# Patient Record
Sex: MALE | Race: WHITE | NOT HISPANIC OR LATINO
[De-identification: names, ages, dates, MRNs, and addresses within clinical notes are randomized per-mention and may not be internally consistent; named-entity substitution may affect disease eponyms.]

---

## 2017-03-08 ENCOUNTER — FORM ENCOUNTER (OUTPATIENT)
Age: 67
End: 2017-03-08

## 2017-03-09 ENCOUNTER — OUTPATIENT (OUTPATIENT)
Dept: OUTPATIENT SERVICES | Facility: HOSPITAL | Age: 67
LOS: 1 days | End: 2017-03-09
Payer: COMMERCIAL

## 2017-03-09 ENCOUNTER — APPOINTMENT (OUTPATIENT)
Dept: ULTRASOUND IMAGING | Facility: IMAGING CENTER | Age: 67
End: 2017-03-09

## 2017-03-09 ENCOUNTER — APPOINTMENT (OUTPATIENT)
Dept: MAMMOGRAPHY | Facility: IMAGING CENTER | Age: 67
End: 2017-03-09

## 2017-03-09 ENCOUNTER — APPOINTMENT (OUTPATIENT)
Dept: SURGICAL ONCOLOGY | Facility: CLINIC | Age: 67
End: 2017-03-09

## 2017-03-09 VITALS
HEART RATE: 60 BPM | BODY MASS INDEX: 30.8 KG/M2 | SYSTOLIC BLOOD PRESSURE: 169 MMHG | WEIGHT: 220 LBS | DIASTOLIC BLOOD PRESSURE: 90 MMHG | RESPIRATION RATE: 16 BRPM | HEIGHT: 71 IN

## 2017-03-09 DIAGNOSIS — Z00.8 ENCOUNTER FOR OTHER GENERAL EXAMINATION: ICD-10-CM

## 2017-03-09 DIAGNOSIS — Z86.39 PERSONAL HISTORY OF OTHER ENDOCRINE, NUTRITIONAL AND METABOLIC DISEASE: ICD-10-CM

## 2017-03-09 DIAGNOSIS — N63 UNSPECIFIED LUMP IN BREAST: ICD-10-CM

## 2017-03-09 DIAGNOSIS — Z87.898 PERSONAL HISTORY OF OTHER SPECIFIED CONDITIONS: ICD-10-CM

## 2017-03-09 DIAGNOSIS — Z86.79 PERSONAL HISTORY OF OTHER DISEASES OF THE CIRCULATORY SYSTEM: ICD-10-CM

## 2017-03-09 DIAGNOSIS — Z78.9 OTHER SPECIFIED HEALTH STATUS: ICD-10-CM

## 2017-03-09 PROBLEM — Z00.00 ENCOUNTER FOR PREVENTIVE HEALTH EXAMINATION: Noted: 2017-03-09

## 2017-03-09 PROCEDURE — G0279: CPT

## 2017-03-09 PROCEDURE — 77066 DX MAMMO INCL CAD BI: CPT

## 2017-03-09 PROCEDURE — 76641 ULTRASOUND BREAST COMPLETE: CPT

## 2017-03-09 RX ORDER — BACILLUS COAGULANS/INULIN 1B-250 MG
CAPSULE ORAL
Refills: 0 | Status: ACTIVE | COMMUNITY

## 2017-03-15 ENCOUNTER — FORM ENCOUNTER (OUTPATIENT)
Age: 67
End: 2017-03-15

## 2017-03-15 ENCOUNTER — RESULT REVIEW (OUTPATIENT)
Age: 67
End: 2017-03-15

## 2017-03-16 ENCOUNTER — APPOINTMENT (OUTPATIENT)
Dept: ULTRASOUND IMAGING | Facility: IMAGING CENTER | Age: 67
End: 2017-03-16

## 2017-03-16 ENCOUNTER — OUTPATIENT (OUTPATIENT)
Dept: OUTPATIENT SERVICES | Facility: HOSPITAL | Age: 67
LOS: 1 days | End: 2017-03-16
Payer: COMMERCIAL

## 2017-03-16 DIAGNOSIS — N63 UNSPECIFIED LUMP IN BREAST: ICD-10-CM

## 2017-03-16 PROCEDURE — 88377 M/PHMTRC ALYS ISHQUANT/SEMIQ: CPT

## 2017-03-16 PROCEDURE — 19083 BX BREAST 1ST LESION US IMAG: CPT

## 2017-03-16 PROCEDURE — 77065 DX MAMMO INCL CAD UNI: CPT

## 2017-03-16 PROCEDURE — 88305 TISSUE EXAM BY PATHOLOGIST: CPT

## 2017-03-16 PROCEDURE — A4648: CPT

## 2017-03-16 PROCEDURE — 88360 TUMOR IMMUNOHISTOCHEM/MANUAL: CPT

## 2017-03-28 ENCOUNTER — APPOINTMENT (OUTPATIENT)
Dept: SURGICAL ONCOLOGY | Facility: CLINIC | Age: 67
End: 2017-03-28

## 2017-03-28 VITALS
BODY MASS INDEX: 30.8 KG/M2 | HEART RATE: 62 BPM | WEIGHT: 220 LBS | SYSTOLIC BLOOD PRESSURE: 165 MMHG | HEIGHT: 71 IN | DIASTOLIC BLOOD PRESSURE: 93 MMHG

## 2017-03-28 DIAGNOSIS — Z83.3 FAMILY HISTORY OF DIABETES MELLITUS: ICD-10-CM

## 2017-03-28 DIAGNOSIS — Z78.9 OTHER SPECIFIED HEALTH STATUS: ICD-10-CM

## 2017-03-28 DIAGNOSIS — Z80.9 FAMILY HISTORY OF MALIGNANT NEOPLASM, UNSPECIFIED: ICD-10-CM

## 2017-05-01 ENCOUNTER — APPOINTMENT (OUTPATIENT)
Dept: NUCLEAR MEDICINE | Facility: HOSPITAL | Age: 67
End: 2017-05-01

## 2017-10-29 ENCOUNTER — INPATIENT (INPATIENT)
Facility: HOSPITAL | Age: 67
LOS: 1 days | Discharge: HOME | DRG: 204 | End: 2017-10-31
Attending: INTERNAL MEDICINE | Admitting: HOSPITALIST
Payer: MEDICARE

## 2017-10-29 VITALS
HEART RATE: 68 BPM | SYSTOLIC BLOOD PRESSURE: 163 MMHG | TEMPERATURE: 98 F | RESPIRATION RATE: 20 BRPM | DIASTOLIC BLOOD PRESSURE: 80 MMHG | OXYGEN SATURATION: 95 %

## 2017-10-29 DIAGNOSIS — C50.929 MALIGNANT NEOPLASM OF UNSPECIFIED SITE OF UNSPECIFIED MALE BREAST: ICD-10-CM

## 2017-10-29 DIAGNOSIS — F32.9 MAJOR DEPRESSIVE DISORDER, SINGLE EPISODE, UNSPECIFIED: ICD-10-CM

## 2017-10-29 DIAGNOSIS — N40.0 BENIGN PROSTATIC HYPERPLASIA WITHOUT LOWER URINARY TRACT SYMPTOMS: ICD-10-CM

## 2017-10-29 DIAGNOSIS — R07.89 OTHER CHEST PAIN: ICD-10-CM

## 2017-10-29 DIAGNOSIS — Z90.11 ACQUIRED ABSENCE OF RIGHT BREAST AND NIPPLE: Chronic | ICD-10-CM

## 2017-10-29 DIAGNOSIS — E11.9 TYPE 2 DIABETES MELLITUS WITHOUT COMPLICATIONS: ICD-10-CM

## 2017-10-29 DIAGNOSIS — R07.81 PLEURODYNIA: ICD-10-CM

## 2017-10-29 DIAGNOSIS — I10 ESSENTIAL (PRIMARY) HYPERTENSION: ICD-10-CM

## 2017-10-29 LAB
ALBUMIN SERPL ELPH-MCNC: 4.3 G/DL — SIGNIFICANT CHANGE UP (ref 3.3–5)
ALP SERPL-CCNC: 72 U/L — SIGNIFICANT CHANGE UP (ref 40–120)
ALT FLD-CCNC: 13 U/L RC — SIGNIFICANT CHANGE UP (ref 10–45)
ANION GAP SERPL CALC-SCNC: 12 MMOL/L — SIGNIFICANT CHANGE UP (ref 5–17)
APTT BLD: 32.9 SEC — SIGNIFICANT CHANGE UP (ref 27.5–37.4)
AST SERPL-CCNC: 15 U/L — SIGNIFICANT CHANGE UP (ref 10–40)
BASOPHILS # BLD AUTO: 0 K/UL — SIGNIFICANT CHANGE UP (ref 0–0.2)
BASOPHILS NFR BLD AUTO: 0.2 % — SIGNIFICANT CHANGE UP (ref 0–2)
BILIRUB SERPL-MCNC: 0.2 MG/DL — SIGNIFICANT CHANGE UP (ref 0.2–1.2)
BUN SERPL-MCNC: 22 MG/DL — SIGNIFICANT CHANGE UP (ref 7–23)
CALCIUM SERPL-MCNC: 10.2 MG/DL — SIGNIFICANT CHANGE UP (ref 8.4–10.5)
CHLORIDE SERPL-SCNC: 102 MMOL/L — SIGNIFICANT CHANGE UP (ref 96–108)
CK SERPL-CCNC: 35 U/L — SIGNIFICANT CHANGE UP (ref 30–200)
CO2 SERPL-SCNC: 29 MMOL/L — SIGNIFICANT CHANGE UP (ref 22–31)
CREAT SERPL-MCNC: 1.09 MG/DL — SIGNIFICANT CHANGE UP (ref 0.5–1.3)
EOSINOPHIL # BLD AUTO: 0.2 K/UL — SIGNIFICANT CHANGE UP (ref 0–0.5)
EOSINOPHIL NFR BLD AUTO: 1.5 % — SIGNIFICANT CHANGE UP (ref 0–6)
ERYTHROCYTE [SEDIMENTATION RATE] IN BLOOD: 42 MM/HR — HIGH (ref 0–20)
GLUCOSE BLDC GLUCOMTR-MCNC: 145 MG/DL — HIGH (ref 70–99)
GLUCOSE BLDC GLUCOMTR-MCNC: 212 MG/DL — HIGH (ref 70–99)
GLUCOSE BLDC GLUCOMTR-MCNC: 235 MG/DL — HIGH (ref 70–99)
GLUCOSE SERPL-MCNC: 134 MG/DL — HIGH (ref 70–99)
HCT VFR BLD CALC: 30.3 % — LOW (ref 39–50)
HGB BLD-MCNC: 11 G/DL — LOW (ref 13–17)
INR BLD: 0.96 RATIO — SIGNIFICANT CHANGE UP (ref 0.88–1.16)
LYMPHOCYTES # BLD AUTO: 0.8 K/UL — LOW (ref 1–3.3)
LYMPHOCYTES # BLD AUTO: 4.8 % — LOW (ref 13–44)
MCHC RBC-ENTMCNC: 36.2 PG — HIGH (ref 27–34)
MCHC RBC-ENTMCNC: 36.3 GM/DL — HIGH (ref 32–36)
MCV RBC AUTO: 99.9 FL — SIGNIFICANT CHANGE UP (ref 80–100)
MONOCYTES # BLD AUTO: 0.2 K/UL — SIGNIFICANT CHANGE UP (ref 0–0.9)
MONOCYTES NFR BLD AUTO: 1.1 % — LOW (ref 2–14)
NEUTROPHILS # BLD AUTO: 15.3 K/UL — HIGH (ref 1.8–7.4)
NEUTROPHILS NFR BLD AUTO: 92.3 % — HIGH (ref 43–77)
NT-PROBNP SERPL-SCNC: 110 PG/ML — SIGNIFICANT CHANGE UP (ref 0–300)
PLATELET # BLD AUTO: 187 K/UL — SIGNIFICANT CHANGE UP (ref 150–400)
POTASSIUM SERPL-MCNC: 3.8 MMOL/L — SIGNIFICANT CHANGE UP (ref 3.5–5.3)
POTASSIUM SERPL-SCNC: 3.8 MMOL/L — SIGNIFICANT CHANGE UP (ref 3.5–5.3)
PROT SERPL-MCNC: 6.8 G/DL — SIGNIFICANT CHANGE UP (ref 6–8.3)
PROTHROM AB SERPL-ACNC: 10.4 SEC — SIGNIFICANT CHANGE UP (ref 9.8–12.7)
RBC # BLD: 3.03 M/UL — LOW (ref 4.2–5.8)
RBC # FLD: 12.9 % — SIGNIFICANT CHANGE UP (ref 10.3–14.5)
SODIUM SERPL-SCNC: 143 MMOL/L — SIGNIFICANT CHANGE UP (ref 135–145)
TROPONIN T SERPL-MCNC: <0.01 NG/ML — SIGNIFICANT CHANGE UP (ref 0–0.06)
TROPONIN T SERPL-MCNC: <0.01 NG/ML — SIGNIFICANT CHANGE UP (ref 0–0.06)
WBC # BLD: 16.6 K/UL — HIGH (ref 3.8–10.5)
WBC # FLD AUTO: 16.6 K/UL — HIGH (ref 3.8–10.5)

## 2017-10-29 PROCEDURE — 99285 EMERGENCY DEPT VISIT HI MDM: CPT | Mod: 25

## 2017-10-29 PROCEDURE — 71020: CPT | Mod: 26

## 2017-10-29 PROCEDURE — 71275 CT ANGIOGRAPHY CHEST: CPT | Mod: 26

## 2017-10-29 PROCEDURE — 93010 ELECTROCARDIOGRAM REPORT: CPT | Mod: 59

## 2017-10-29 PROCEDURE — 99223 1ST HOSP IP/OBS HIGH 75: CPT | Mod: AI

## 2017-10-29 RX ORDER — MORPHINE SULFATE 50 MG/1
2 CAPSULE, EXTENDED RELEASE ORAL ONCE
Qty: 0 | Refills: 0 | Status: DISCONTINUED | OUTPATIENT
Start: 2017-10-29 | End: 2017-10-29

## 2017-10-29 RX ORDER — SODIUM CHLORIDE 9 MG/ML
1000 INJECTION INTRAMUSCULAR; INTRAVENOUS; SUBCUTANEOUS ONCE
Qty: 0 | Refills: 0 | Status: DISCONTINUED | OUTPATIENT
Start: 2017-10-29 | End: 2017-10-29

## 2017-10-29 RX ORDER — AMLODIPINE BESYLATE 2.5 MG/1
10 TABLET ORAL DAILY
Qty: 0 | Refills: 0 | Status: DISCONTINUED | OUTPATIENT
Start: 2017-10-29 | End: 2017-10-31

## 2017-10-29 RX ORDER — TAMOXIFEN CITRATE 20 MG/1
20 TABLET, FILM COATED ORAL DAILY
Qty: 0 | Refills: 0 | Status: DISCONTINUED | OUTPATIENT
Start: 2017-10-29 | End: 2017-10-31

## 2017-10-29 RX ORDER — LIDOCAINE 4 G/100G
1 CREAM TOPICAL ONCE
Qty: 0 | Refills: 0 | Status: COMPLETED | OUTPATIENT
Start: 2017-10-29 | End: 2017-10-29

## 2017-10-29 RX ORDER — SERTRALINE 25 MG/1
100 TABLET, FILM COATED ORAL DAILY
Qty: 0 | Refills: 0 | Status: DISCONTINUED | OUTPATIENT
Start: 2017-10-29 | End: 2017-10-31

## 2017-10-29 RX ORDER — DEXTROSE 50 % IN WATER 50 %
25 SYRINGE (ML) INTRAVENOUS ONCE
Qty: 0 | Refills: 0 | Status: DISCONTINUED | OUTPATIENT
Start: 2017-10-29 | End: 2017-10-31

## 2017-10-29 RX ORDER — TAMSULOSIN HYDROCHLORIDE 0.4 MG/1
0.4 CAPSULE ORAL AT BEDTIME
Qty: 0 | Refills: 0 | Status: DISCONTINUED | OUTPATIENT
Start: 2017-10-29 | End: 2017-10-31

## 2017-10-29 RX ORDER — METFORMIN HYDROCHLORIDE 850 MG/1
1 TABLET ORAL
Qty: 0 | Refills: 0 | COMMUNITY

## 2017-10-29 RX ORDER — NEBIVOLOL HYDROCHLORIDE 5 MG/1
1 TABLET ORAL
Qty: 0 | Refills: 0 | COMMUNITY

## 2017-10-29 RX ORDER — AMLODIPINE AND VALSARTAN 5; 320 MG/1; MG/1
1 TABLET, FILM COATED ORAL
Qty: 0 | Refills: 0 | COMMUNITY

## 2017-10-29 RX ORDER — GEMFIBROZIL 600 MG
1 TABLET ORAL
Qty: 0 | Refills: 0 | COMMUNITY

## 2017-10-29 RX ORDER — INSULIN LISPRO 100/ML
VIAL (ML) SUBCUTANEOUS AT BEDTIME
Qty: 0 | Refills: 0 | Status: DISCONTINUED | OUTPATIENT
Start: 2017-10-29 | End: 2017-10-31

## 2017-10-29 RX ORDER — ASPIRIN/CALCIUM CARB/MAGNESIUM 324 MG
324 TABLET ORAL ONCE
Qty: 0 | Refills: 0 | Status: COMPLETED | OUTPATIENT
Start: 2017-10-29 | End: 2017-10-29

## 2017-10-29 RX ORDER — NEBIVOLOL HYDROCHLORIDE 5 MG/1
5 TABLET ORAL DAILY
Qty: 0 | Refills: 0 | Status: DISCONTINUED | OUTPATIENT
Start: 2017-10-29 | End: 2017-10-31

## 2017-10-29 RX ORDER — ENOXAPARIN SODIUM 100 MG/ML
40 INJECTION SUBCUTANEOUS DAILY
Qty: 0 | Refills: 0 | Status: DISCONTINUED | OUTPATIENT
Start: 2017-10-29 | End: 2017-10-31

## 2017-10-29 RX ORDER — INSULIN LISPRO 100/ML
VIAL (ML) SUBCUTANEOUS
Qty: 0 | Refills: 0 | Status: DISCONTINUED | OUTPATIENT
Start: 2017-10-29 | End: 2017-10-31

## 2017-10-29 RX ORDER — SODIUM CHLORIDE 9 MG/ML
1000 INJECTION, SOLUTION INTRAVENOUS
Qty: 0 | Refills: 0 | Status: DISCONTINUED | OUTPATIENT
Start: 2017-10-29 | End: 2017-10-31

## 2017-10-29 RX ORDER — SODIUM CHLORIDE 9 MG/ML
1000 INJECTION INTRAMUSCULAR; INTRAVENOUS; SUBCUTANEOUS ONCE
Qty: 0 | Refills: 0 | Status: COMPLETED | OUTPATIENT
Start: 2017-10-29 | End: 2017-10-29

## 2017-10-29 RX ORDER — DEXTROSE 50 % IN WATER 50 %
1 SYRINGE (ML) INTRAVENOUS ONCE
Qty: 0 | Refills: 0 | Status: DISCONTINUED | OUTPATIENT
Start: 2017-10-29 | End: 2017-10-31

## 2017-10-29 RX ORDER — VALSARTAN 80 MG/1
160 TABLET ORAL DAILY
Qty: 0 | Refills: 0 | Status: DISCONTINUED | OUTPATIENT
Start: 2017-10-29 | End: 2017-10-31

## 2017-10-29 RX ORDER — HYDROMORPHONE HYDROCHLORIDE 2 MG/ML
0.5 INJECTION INTRAMUSCULAR; INTRAVENOUS; SUBCUTANEOUS EVERY 6 HOURS
Qty: 0 | Refills: 0 | Status: DISCONTINUED | OUTPATIENT
Start: 2017-10-29 | End: 2017-10-31

## 2017-10-29 RX ORDER — NITROGLYCERIN 6.5 MG
0.4 CAPSULE, EXTENDED RELEASE ORAL ONCE
Qty: 0 | Refills: 0 | Status: COMPLETED | OUTPATIENT
Start: 2017-10-29 | End: 2017-10-29

## 2017-10-29 RX ORDER — DEXTROSE 50 % IN WATER 50 %
12.5 SYRINGE (ML) INTRAVENOUS ONCE
Qty: 0 | Refills: 0 | Status: DISCONTINUED | OUTPATIENT
Start: 2017-10-29 | End: 2017-10-31

## 2017-10-29 RX ORDER — HYDROMORPHONE HYDROCHLORIDE 2 MG/ML
1 INJECTION INTRAMUSCULAR; INTRAVENOUS; SUBCUTANEOUS ONCE
Qty: 0 | Refills: 0 | Status: DISCONTINUED | OUTPATIENT
Start: 2017-10-29 | End: 2017-10-29

## 2017-10-29 RX ORDER — ACETAMINOPHEN 500 MG
1000 TABLET ORAL ONCE
Qty: 0 | Refills: 0 | Status: COMPLETED | OUTPATIENT
Start: 2017-10-29 | End: 2017-10-29

## 2017-10-29 RX ORDER — SODIUM CHLORIDE 9 MG/ML
3 INJECTION INTRAMUSCULAR; INTRAVENOUS; SUBCUTANEOUS ONCE
Qty: 0 | Refills: 0 | Status: COMPLETED | OUTPATIENT
Start: 2017-10-29 | End: 2017-10-29

## 2017-10-29 RX ORDER — MIRABEGRON 50 MG/1
1 TABLET, EXTENDED RELEASE ORAL
Qty: 0 | Refills: 0 | COMMUNITY

## 2017-10-29 RX ORDER — HYDROCHLOROTHIAZIDE 25 MG
50 TABLET ORAL DAILY
Qty: 0 | Refills: 0 | Status: DISCONTINUED | OUTPATIENT
Start: 2017-10-29 | End: 2017-10-31

## 2017-10-29 RX ORDER — MORPHINE SULFATE 50 MG/1
4 CAPSULE, EXTENDED RELEASE ORAL ONCE
Qty: 0 | Refills: 0 | Status: DISCONTINUED | OUTPATIENT
Start: 2017-10-29 | End: 2017-10-29

## 2017-10-29 RX ORDER — ASPIRIN/CALCIUM CARB/MAGNESIUM 324 MG
81 TABLET ORAL DAILY
Qty: 0 | Refills: 0 | Status: DISCONTINUED | OUTPATIENT
Start: 2017-10-29 | End: 2017-10-31

## 2017-10-29 RX ORDER — GLUCAGON INJECTION, SOLUTION 0.5 MG/.1ML
1 INJECTION, SOLUTION SUBCUTANEOUS ONCE
Qty: 0 | Refills: 0 | Status: DISCONTINUED | OUTPATIENT
Start: 2017-10-29 | End: 2017-10-31

## 2017-10-29 RX ORDER — FAMOTIDINE 10 MG/ML
20 INJECTION INTRAVENOUS ONCE
Qty: 0 | Refills: 0 | Status: COMPLETED | OUTPATIENT
Start: 2017-10-29 | End: 2017-10-29

## 2017-10-29 RX ORDER — INFLUENZA VIRUS VACCINE 15; 15; 15; 15 UG/.5ML; UG/.5ML; UG/.5ML; UG/.5ML
0.5 SUSPENSION INTRAMUSCULAR ONCE
Qty: 0 | Refills: 0 | Status: DISCONTINUED | OUTPATIENT
Start: 2017-10-29 | End: 2017-10-31

## 2017-10-29 RX ADMIN — HYDROMORPHONE HYDROCHLORIDE 1 MILLIGRAM(S): 2 INJECTION INTRAMUSCULAR; INTRAVENOUS; SUBCUTANEOUS at 08:25

## 2017-10-29 RX ADMIN — LIDOCAINE 1 PATCH: 4 CREAM TOPICAL at 21:28

## 2017-10-29 RX ADMIN — LIDOCAINE 1 PATCH: 4 CREAM TOPICAL at 08:25

## 2017-10-29 RX ADMIN — FAMOTIDINE 20 MILLIGRAM(S): 10 INJECTION INTRAVENOUS at 06:22

## 2017-10-29 RX ADMIN — Medication 324 MILLIGRAM(S): at 03:08

## 2017-10-29 RX ADMIN — AMLODIPINE BESYLATE 10 MILLIGRAM(S): 2.5 TABLET ORAL at 19:12

## 2017-10-29 RX ADMIN — MORPHINE SULFATE 2 MILLIGRAM(S): 50 CAPSULE, EXTENDED RELEASE ORAL at 05:10

## 2017-10-29 RX ADMIN — Medication 0.4 MILLIGRAM(S): at 05:58

## 2017-10-29 RX ADMIN — MORPHINE SULFATE 4 MILLIGRAM(S): 50 CAPSULE, EXTENDED RELEASE ORAL at 06:22

## 2017-10-29 RX ADMIN — Medication 400 MILLIGRAM(S): at 04:30

## 2017-10-29 RX ADMIN — HYDROMORPHONE HYDROCHLORIDE 0.5 MILLIGRAM(S): 2 INJECTION INTRAMUSCULAR; INTRAVENOUS; SUBCUTANEOUS at 16:48

## 2017-10-29 RX ADMIN — SODIUM CHLORIDE 4000 MILLILITER(S): 9 INJECTION INTRAMUSCULAR; INTRAVENOUS; SUBCUTANEOUS at 05:59

## 2017-10-29 RX ADMIN — TAMSULOSIN HYDROCHLORIDE 0.4 MILLIGRAM(S): 0.4 CAPSULE ORAL at 21:31

## 2017-10-29 RX ADMIN — SODIUM CHLORIDE 3 MILLILITER(S): 9 INJECTION INTRAMUSCULAR; INTRAVENOUS; SUBCUTANEOUS at 03:08

## 2017-10-29 RX ADMIN — Medication 4: at 18:20

## 2017-10-29 RX ADMIN — SODIUM CHLORIDE 4000 MILLILITER(S): 9 INJECTION INTRAMUSCULAR; INTRAVENOUS; SUBCUTANEOUS at 03:28

## 2017-10-29 RX ADMIN — HYDROMORPHONE HYDROCHLORIDE 0.5 MILLIGRAM(S): 2 INJECTION INTRAMUSCULAR; INTRAVENOUS; SUBCUTANEOUS at 16:13

## 2017-10-29 NOTE — H&P ADULT - NEGATIVE BREAST SYMPTOMS
no breast lump L/no nipple discharge L/no nipple discharge R/no breast lump R/no breast tenderness R/no breast tenderness L

## 2017-10-29 NOTE — H&P ADULT - HISTORY OF PRESENT ILLNESS
68yo M with  ER+ PA-  breast cancer s/p chemotherapy and R mastectomy with tissue expanders, p/w chest heaviness x 3 days. Patient received chemotherapy 5 days ago. He went to the gym and felt some mild left sided chest heaviness 8/10 with no radiation the following day. It lasted a few hours and was worse with movement and laying down. He then decided to go swimming and it felt better. Night before admission patient had the same pain again but this time it occurred when he was laying down and was worse with inspiration. No recent travel, no sob, no pnd, no LE edema. He has had chest pain few years ago but nothing like this. No fevers, no chills, no recent sick contacts. This time when he received chemotherapy he did not received any growth factor support.

## 2017-10-29 NOTE — H&P ADULT - NSHPPHYSICALEXAM_GEN_ALL_CORE
Vital Signs Last 24 Hrs  T(C): 36.8 (29 Oct 2017 11:45), Max: 36.8 (29 Oct 2017 02:27)  T(F): 98.2 (29 Oct 2017 11:45), Max: 98.3 (29 Oct 2017 02:27)  HR: 56 (29 Oct 2017 11:45) (56 - 72)  BP: 147/89 (29 Oct 2017 11:45) (147/89 - 171/88)  BP(mean): --  RR: 20 (29 Oct 2017 11:52) (18 - 20)  SpO2: 95% (29 Oct 2017 11:52) (87% - 96%)

## 2017-10-29 NOTE — ED ADULT NURSE REASSESSMENT NOTE - NS ED NURSE REASSESS COMMENT FT1
Report received from night RALPH Cheng. Pt found sleeping in semi-richmond position. VS documented. Pt states chest pain is unrelieved, on cardiac monitor in sinus rhythm. MD aware, will continue to monitor.

## 2017-10-29 NOTE — H&P ADULT - NEGATIVE CARDIOVASCULAR SYMPTOMS
no palpitations/no claudication/no orthopnea/no dyspnea on exertion/no paroxysmal nocturnal dyspnea/no peripheral edema

## 2017-10-29 NOTE — ED PROVIDER NOTE - PROGRESS NOTE DETAILS
complaint of worsening CP, EKG no changes, offered tylenol patient declined -Lachelle DO complaint of pain still despite tylenol/morphine, will trial NO, repeat EKG prior wnl. trop engative, CTA negative for PE only with atelectasis -Slowlise DO

## 2017-10-29 NOTE — H&P ADULT - PROBLEM SELECTOR PLAN 1
non reproducible chest pain/ doubt msk   atypical chest pain - will trend enzymes, place on telemetry  given leukocytosis, elevated esr- and pleuritic type chest pain- keeping pericarditis on the differential so will obtain 2d echo and start on baby aspirin in the mean time,  ekg not showing signs of acute pericarditis- no st elevations/ no depression of pr interval in avR  - f/u cardio recs- follows up with dr. georges- to see in hospital  - monitor esr/crp  - if pericarditis ruled out by 2d echo pt may warrant inpatient stress testing  follow up a1c/lipid panel ( not on statin)  - PE ruled out by CTA ( spoke w rads resident who did not see any pericardial effusion on ct) non reproducible chest pain/ doubt msk   atypical chest pain - will trend enzymes, place on telemetry  given leukocytosis, elevated esr- and pleuritic type chest pain- keeping pericarditis on the differential so will obtain 2d echo   - aspirin 81mg daily  ekg not showing signs of acute pericarditis- no st elevations/ no depression of pr interval in avR  - f/u cardio recs- follows up with dr. georges- to see in hospital  - monitor esr/crp  - if pericarditis ruled out by 2d echo pt may warrant inpatient stress testing  follow up a1c/lipid panel ( not on statin)  - PE ruled out by CTA ( spoke w rads resident who did not see any pericardial effusion on ct) non reproducible chest pain/ doubt msk   atypical chest pain - will trend enzymes, place on telemetry  given leukocytosis, elevated esr- and pleuritic type chest pain- keeping pericarditis on the differential so will obtain 2d echo   - aspirin 81mg daily  ekg not showing signs of acute pericarditis- no st elevations/ no depression of pr interval in avR  - f/u cardio recs- follows up with dr. georges- to see in hospital  - monitor esr/crp  - if pericarditis ruled out by 2d echo pt may warrant inpatient stress testing  follow up a1c/lipid panel ( not on statin)  - PE ruled out by CTA ( spoke w rads resident who did not see any pericardial effusion on ct)  - obtain blood cultures

## 2017-10-29 NOTE — H&P ADULT - NSHPLABSRESULTS_GEN_ALL_CORE
11.0   16.6  )-----------( 187      ( 29 Oct 2017 03:09 )             30.3       143  |  102  |  22  ----------------------------<  134<H>  3.8   |  29  |  1.09    Ca    10.2      29 Oct 2017 03:09    TPro  6.8  /  Alb  4.3  /  TBili  0.2  /  DBili  x   /  AST  15  /  ALT  13  /  AlkPhos  72  10-29    Troponin T, Serum (10.29.17 @ 03:09)    Troponin T, Serum: <0.01: Reference Interval for Troponin T  Less than 0.06 ng/mL - includes the 99th percentile of a healthy  population at a method C.V. of 10% or less.  NOTE: Troponin T is measured by the Roche ECLIA method and these  results are not interchangable with Troponin I results since they are  different assays with different reference intervals. ng/mL  < from: CT Angio Chest w/ IV Cont (10.29.17 @ 05:07) >    FINDINGS:    LUNGS AND LARGE AIRWAYS: Patent central airways. Lingula and bilateral   lower lobe subsegmental atelectasis.  PLEURA: No pleural effusion.  VESSELS: Evaluation for subsegmental pulmonary embolism is limited by   respiratory motion and suboptimal opacification of the pulmonary tree. No   main, lobar, or segmental pulmonary embolism.  HEART: Heart size is normal. No pericardial effusion. Coronary artery   calcification.  MEDIASTINUM AND ABDULAZIZ: Few small volume nonspecific mediastinal lymph   nodes.  CHEST WALL AND LOWER NECK: Right axillary surgical clips are noted.   Patient is status post right breast implant. There is infolding of the   implant capsule, which may be seen in setting of capsular contracture.  UPPER ABDOMEN: Status post cholecystectomy. Mild nodular thickening of   the bilateral adrenal glands, stable since 10/23/2015.  BONES: Multilevel degenerative changes of the spine.    IMPRESSION:    No main, lobar, or segmental pulmonary embolism. Limited evaluation for   subsegmental pulmonary embolism secondary to respiratory motion.      < end of copied text >    cxr personally reviewed by me no obvious infiltrate  ekg personally reviewed by me twi [id number]

## 2017-10-29 NOTE — ED ADULT NURSE NOTE - OBJECTIVE STATEMENT
67y male presents to the ER c/o chest pressure. Pt is alert and oriented x 4 and speaking coherently. Pt states around 2300 on 10/28 he woke up from sleep with 6/10 chest pressure. Pt states he recently started working out- denies pain worsening when pressing on chest. pt states the pain is midsternal, non radiating. Pt has hx of htn, high cholesterol, dm, and breast ca. pt recently completed chemo for breast ca. pt denies sob, fevers,chills, n/v/d. Pt in NAD, respirations equal and regular. EKG completed. pt on tele. safety maintained. will reassess.

## 2017-10-29 NOTE — ED PROVIDER NOTE - NOTES
recommend checking ESR and starting steroids for possible pericarditis and formal echo. would like dr. loredo to see patient in hospital recommend checking ESR and starting steroids for possible pericarditis and formal echo. would like dr. loredo to see patient in hospital, paged Dr. Loredo no answer by time of sign out

## 2017-10-29 NOTE — ED PROVIDER NOTE - NS ED ROS FT
ROS: +CP no SOB. no cough. no fever. no n/v/d/c. no abd pain. no rash. no bleeding. no urinary complaints. no weakness. no vision changes. no HA. no neck/back pain. no extremity swelling/deformity. No change in mental status.

## 2017-10-29 NOTE — H&P ADULT - NSHPOUTPATIENTPROVIDERS_GEN_ALL_CORE
dr torre ( [facility] [place]) dr torre ( [facility] [place])  dr. egorges ( [facility])

## 2017-10-29 NOTE — ED ADULT NURSE REASSESSMENT NOTE - NS ED NURSE REASSESS COMMENT FT1
Pt found resting in semi-richmond position, no labored breathing or distress. Pt appears diaphoretic with Spo2 87% on room air. Oxygen administered via NC at 4 lpm, Spo2 increased to 95%; blood sugar 212. Pt currently denies any headache, feeling lightheaded, dizziness, difficulty breathing, abdominal pain, N/V/D, fever or chills. PA aware; will continue to monitor. Pt found resting in semi-richmond position, no labored breathing or distress. Pt appears diaphoretic with Spo2 87% on room air. Oxygen administered via NC at 4 lpm, Spo2 increased to 95%; blood sugar 212. Pt currently denies any headache, feeling lightheaded, dizziness, difficulty breathing, abdominal pain, N/V/D, fever or chills. MD aware; will continue to monitor.

## 2017-10-29 NOTE — ED PROVIDER NOTE - ATTENDING CONTRIBUTION TO CARE
Attending MD Brandt: I personally have seen and examined this patient.  Resident note reviewed and agree on plan of care and except where noted.  See below for details.     67M with PMH including breast cancer on chemo, DM, HTN, HLD presents to the ED with chest pain.  Reports last chemo about 5 days ago, exercised 3 days ago.  Reports felt chest pressure, tightness and soreness the day following his exercising.  Reports worse with movement.  Reports then went swimming.  Reports tonight reports chest pain at rest, pleuritic, worse with supine.  Denies history of DVT or PE.  Reports bilateral lower extremity paresthesias, no weakness, no HA.  Denies previous similar episode.  Denies abdominal pain, nausea, vomiting, diarrhea, blood in stools. Denies dysuria, hematuria, change in urinary habits including frequency, urgency. Denies fevers, chills, dizziness, weakness. On exam, NAD, head NCAT, PERRL, FROM at neck, no tenderness to palpation or stepoffs along length of spine, lungs CTAB with good inspiratory effort, +S1S2, no m/r/g, abdomen soft with +BS, NT, ND, no CVAT, moving all extremities with 5/5 strength bilateral upper and lower extremities, good and equal  strength bilaterally; A/P: 67M with pleuritic chest pain in the setting of cancer, will obtain CTA chest, will give ASA, will obtian EKG and labs, will give IVFs, pain control, CXR, admit

## 2017-10-29 NOTE — ED PROVIDER NOTE - OBJECTIVE STATEMENT
68yo M with breast CA completed chemo 5 days ago, worked out 3 days ago, felt chest pressure/tightness/soreness the next day worse with movement, went swimming felt better. tonight chest pain @ rest, +pleuritic, +worse with lying down. no h/o PE/DVT. no LE edema. feeling paresthesias b/l LE no weakness. no HA. no h/o CAD.     PCP- Garett 68yo M with breast CA completed chemo 5 days ago, worked out 3 days ago, felt chest pressure/tightness/soreness the next day worse with movement, went swimming felt better. tonight chest pain @ rest, +pleuritic, +worse with lying down. no h/o PE/DVT. no LE edema. feeling paresthesias b/l LE no weakness. no HA. no h/o CAD.     PCP- @ [facility]

## 2017-10-29 NOTE — ED PROVIDER NOTE - MEDICAL DECISION MAKING DETAILS
could be [facility] started after working out, but not reproducible, has multiple cardiac risk factors will check trop. with CA at risk for PE will CT. no suspicion for GI source. no infectious sx. Heart score 5, will need provacative testing

## 2017-10-29 NOTE — ED ADULT NURSE NOTE - PMH
Breast cancer in male    Cholecystitis    DM (diabetes mellitus)    High cholesterol    HTN (hypertension)

## 2017-10-29 NOTE — ED PROVIDER NOTE - PHYSICAL EXAMINATION
Gen: NAD, AOx3  Head: NCAT  HEENT: PERRL, oral mucosa moist, normal conjunctiva, neck supple  Lung: CTAB, no respiratory distress  CV: rrr, no murmur, Normal perfusion, no reproducible chest wall pain  Abd: soft, NTND  MSK: No edema, no visible deformities  Neuro: No focal neurologic deficits  Skin: No rash   Psych: normal affect

## 2017-10-30 ENCOUNTER — TRANSCRIPTION ENCOUNTER (OUTPATIENT)
Age: 67
End: 2017-10-30

## 2017-10-30 DIAGNOSIS — E11.9 TYPE 2 DIABETES MELLITUS WITHOUT COMPLICATIONS: ICD-10-CM

## 2017-10-30 DIAGNOSIS — D72.829 ELEVATED WHITE BLOOD CELL COUNT, UNSPECIFIED: ICD-10-CM

## 2017-10-30 DIAGNOSIS — R07.81 PLEURODYNIA: ICD-10-CM

## 2017-10-30 DIAGNOSIS — I10 ESSENTIAL (PRIMARY) HYPERTENSION: ICD-10-CM

## 2017-10-30 LAB
ALBUMIN SERPL ELPH-MCNC: 3.8 G/DL — SIGNIFICANT CHANGE UP (ref 3.3–5)
ALP SERPL-CCNC: 52 U/L — SIGNIFICANT CHANGE UP (ref 40–120)
ALT FLD-CCNC: 10 U/L RC — SIGNIFICANT CHANGE UP (ref 10–45)
ANION GAP SERPL CALC-SCNC: 14 MMOL/L — SIGNIFICANT CHANGE UP (ref 5–17)
AST SERPL-CCNC: 13 U/L — SIGNIFICANT CHANGE UP (ref 10–40)
BASOPHILS # BLD AUTO: 0 K/UL — SIGNIFICANT CHANGE UP (ref 0–0.2)
BASOPHILS NFR BLD AUTO: 0.3 % — SIGNIFICANT CHANGE UP (ref 0–2)
BILIRUB SERPL-MCNC: 0.4 MG/DL — SIGNIFICANT CHANGE UP (ref 0.2–1.2)
BUN SERPL-MCNC: 21 MG/DL — SIGNIFICANT CHANGE UP (ref 7–23)
CALCIUM SERPL-MCNC: 9.4 MG/DL — SIGNIFICANT CHANGE UP (ref 8.4–10.5)
CHLORIDE SERPL-SCNC: 100 MMOL/L — SIGNIFICANT CHANGE UP (ref 96–108)
CHOLEST SERPL-MCNC: 111 MG/DL — SIGNIFICANT CHANGE UP (ref 10–199)
CO2 SERPL-SCNC: 24 MMOL/L — SIGNIFICANT CHANGE UP (ref 22–31)
CREAT SERPL-MCNC: 0.99 MG/DL — SIGNIFICANT CHANGE UP (ref 0.5–1.3)
CRP SERPL-MCNC: 11.3 MG/DL — HIGH (ref 0–0.4)
EOSINOPHIL # BLD AUTO: 0 K/UL — SIGNIFICANT CHANGE UP (ref 0–0.5)
EOSINOPHIL NFR BLD AUTO: 0.3 % — SIGNIFICANT CHANGE UP (ref 0–6)
ERYTHROCYTE [SEDIMENTATION RATE] IN BLOOD: 77 MM/HR — HIGH (ref 0–20)
GLUCOSE BLDC GLUCOMTR-MCNC: 115 MG/DL — HIGH (ref 70–99)
GLUCOSE BLDC GLUCOMTR-MCNC: 138 MG/DL — HIGH (ref 70–99)
GLUCOSE BLDC GLUCOMTR-MCNC: 144 MG/DL — HIGH (ref 70–99)
GLUCOSE SERPL-MCNC: 143 MG/DL — HIGH (ref 70–99)
HBA1C BLD-MCNC: 5.2 % — SIGNIFICANT CHANGE UP (ref 4–5.6)
HCT VFR BLD CALC: 29.3 % — LOW (ref 39–50)
HDLC SERPL-MCNC: 34 MG/DL — LOW (ref 40–125)
HGB BLD-MCNC: 10.3 G/DL — LOW (ref 13–17)
LIPID PNL WITH DIRECT LDL SERPL: 61 MG/DL — SIGNIFICANT CHANGE UP
LYMPHOCYTES # BLD AUTO: 0.8 K/UL — LOW (ref 1–3.3)
LYMPHOCYTES # BLD AUTO: 5.6 % — LOW (ref 13–44)
MAGNESIUM SERPL-MCNC: 1.9 MG/DL — SIGNIFICANT CHANGE UP (ref 1.6–2.6)
MCHC RBC-ENTMCNC: 35.3 GM/DL — SIGNIFICANT CHANGE UP (ref 32–36)
MCHC RBC-ENTMCNC: 35.8 PG — HIGH (ref 27–34)
MCV RBC AUTO: 102 FL — HIGH (ref 80–100)
MONOCYTES # BLD AUTO: 0.4 K/UL — SIGNIFICANT CHANGE UP (ref 0–0.9)
MONOCYTES NFR BLD AUTO: 3 % — SIGNIFICANT CHANGE UP (ref 2–14)
NEUTROPHILS # BLD AUTO: 12.2 K/UL — HIGH (ref 1.8–7.4)
NEUTROPHILS NFR BLD AUTO: 90.8 % — HIGH (ref 43–77)
PLATELET # BLD AUTO: 163 K/UL — SIGNIFICANT CHANGE UP (ref 150–400)
POTASSIUM SERPL-MCNC: 3.2 MMOL/L — LOW (ref 3.5–5.3)
POTASSIUM SERPL-SCNC: 3.2 MMOL/L — LOW (ref 3.5–5.3)
PROT SERPL-MCNC: 6.5 G/DL — SIGNIFICANT CHANGE UP (ref 6–8.3)
RAPID RVP RESULT: SIGNIFICANT CHANGE UP
RBC # BLD: 2.89 M/UL — LOW (ref 4.2–5.8)
RBC # FLD: 12.7 % — SIGNIFICANT CHANGE UP (ref 10.3–14.5)
SODIUM SERPL-SCNC: 138 MMOL/L — SIGNIFICANT CHANGE UP (ref 135–145)
TOTAL CHOLESTEROL/HDL RATIO MEASUREMENT: 3.3 RATIO — LOW (ref 3.4–9.6)
TRIGL SERPL-MCNC: 79 MG/DL — SIGNIFICANT CHANGE UP (ref 10–149)
TROPONIN T SERPL-MCNC: <0.01 NG/ML — SIGNIFICANT CHANGE UP (ref 0–0.06)
WBC # BLD: 13.5 K/UL — HIGH (ref 3.8–10.5)
WBC # FLD AUTO: 13.5 K/UL — HIGH (ref 3.8–10.5)

## 2017-10-30 PROCEDURE — 99233 SBSQ HOSP IP/OBS HIGH 50: CPT

## 2017-10-30 RX ORDER — TAMSULOSIN HYDROCHLORIDE 0.4 MG/1
1 CAPSULE ORAL
Qty: 0 | Refills: 0 | COMMUNITY
Start: 2017-10-30

## 2017-10-30 RX ORDER — PANTOPRAZOLE SODIUM 20 MG/1
40 TABLET, DELAYED RELEASE ORAL
Qty: 0 | Refills: 0 | Status: DISCONTINUED | OUTPATIENT
Start: 2017-10-30 | End: 2017-10-31

## 2017-10-30 RX ORDER — POTASSIUM CHLORIDE 20 MEQ
40 PACKET (EA) ORAL ONCE
Qty: 0 | Refills: 0 | Status: COMPLETED | OUTPATIENT
Start: 2017-10-30 | End: 2017-10-30

## 2017-10-30 RX ADMIN — PANTOPRAZOLE SODIUM 40 MILLIGRAM(S): 20 TABLET, DELAYED RELEASE ORAL at 12:42

## 2017-10-30 RX ADMIN — SERTRALINE 100 MILLIGRAM(S): 25 TABLET, FILM COATED ORAL at 05:48

## 2017-10-30 RX ADMIN — NEBIVOLOL HYDROCHLORIDE 5 MILLIGRAM(S): 5 TABLET ORAL at 17:57

## 2017-10-30 RX ADMIN — TAMOXIFEN CITRATE 20 MILLIGRAM(S): 20 TABLET, FILM COATED ORAL at 05:49

## 2017-10-30 RX ADMIN — AMLODIPINE BESYLATE 10 MILLIGRAM(S): 2.5 TABLET ORAL at 05:48

## 2017-10-30 RX ADMIN — TAMSULOSIN HYDROCHLORIDE 0.4 MILLIGRAM(S): 0.4 CAPSULE ORAL at 20:44

## 2017-10-30 RX ADMIN — Medication 50 MILLIGRAM(S): at 17:57

## 2017-10-30 RX ADMIN — Medication 40 MILLIEQUIVALENT(S): at 10:39

## 2017-10-30 RX ADMIN — ENOXAPARIN SODIUM 40 MILLIGRAM(S): 100 INJECTION SUBCUTANEOUS at 12:43

## 2017-10-30 RX ADMIN — VALSARTAN 160 MILLIGRAM(S): 80 TABLET ORAL at 05:48

## 2017-10-30 NOTE — DISCHARGE NOTE ADULT - CARE PROVIDER_API CALL
Dave Bajwa), Cardiovascular Disease  [address] [address]  Phone: [phone]  Fax: [phone]

## 2017-10-30 NOTE — PROGRESS NOTE ADULT - PROBLEM SELECTOR PLAN 1
Resolved chest pain   No ST changes / but elevated WBC , I have spoken with the outpatient cardiologist  ( Dr Bajwa) who states that patient might have had leucocytosis recently and I have called the Oncologist at [facility] since patient is being followed on a regular basis there for any previous WBC count.    Troponin neg X 3   Elevated inflammatory marker ( unsure if new or not or related to malignancy or any previous infection / awaiting on Lab report from Oncologist /   No current pleurisy / Rt breast expander appears stable.    ??Cardio evaluation .    F/up TTE from [facility] hosp Resolved chest pain   No ST changes / but elevated WBC , I have spoken with the outpatient cardiologist  ( Dr Bajwa) who states that patient might have had leucocytosis recently and I have called the Oncologist at [facility] since patient is being followed on a regular basis there for any previous WBC count.    Troponin neg X 3   Elevated inflammatory marker ( unsure if new or not or related to malignancy or any previous infection )   WBC done at the [facility] [place] on 10/24/17 was 6.2 / now admission WBC was 16, will get RVP stat and F/up blood CX   No current pleurisy / Rt breast expander appears stable.    ??Cardio evaluation .    F/up TTE from [facility] hosp

## 2017-10-30 NOTE — PROGRESS NOTE ADULT - ASSESSMENT
66yo M with  ER+ NC-  breast cancer s/p chemotherapy and R mastectomy with tissue expanders, p/w chest heaviness x 3 days. Patient received chemotherapy 5 days ago and is on Tamoxifen.  Patient had no fever or chills or known sick contact .  Patient has been hemodynamically stable and has received one dose of  mg, 2 Liters of NS boluses, IV tylenol and IV dilaudid with no current chest pain.  WBC count = 66yo M with  ER+ NH-  breast cancer s/p chemotherapy and R mastectomy with tissue expanders, p/w chest heaviness x 3 days. Patient received chemotherapy 5 days ago and is on Tamoxifen.  Patient had no fever or chills or known sick contact .  Patient has been hemodynamically stable and has received one dose of  mg, 2 Liters of NS boluses, IV tylenol and IV dilaudid with no current chest pain.  Troponin Negative X 3 with elevated CRP and ESR as well as WBC although on a downward trend.

## 2017-10-30 NOTE — PROGRESS NOTE ADULT - SUBJECTIVE AND OBJECTIVE BOX
Patient is a 67y old  Male who presents with a chief complaint of chest pain (29 Oct 2017 14:28)      INTERVAL HPI/OVERNIGHT EVENTS:    Patient offers no complaints and wants to be d/c   Medications:MEDICATIONS  (STANDING):  amLODIPine   Tablet 10 milliGRAM(s) Oral daily  aspirin  chewable 81 milliGRAM(s) Oral daily  dextrose 5%. 1000 milliLiter(s) (50 mL/Hr) IV Continuous <Continuous>  dextrose 50% Injectable 12.5 Gram(s) IV Push once  dextrose 50% Injectable 25 Gram(s) IV Push once  dextrose 50% Injectable 25 Gram(s) IV Push once  enoxaparin Injectable 40 milliGRAM(s) SubCutaneous daily  hydrochlorothiazide 50 milliGRAM(s) Oral daily  influenza   Vaccine 0.5 milliLiter(s) IntraMuscular once  insulin lispro (HumaLOG) corrective regimen sliding scale   SubCutaneous at bedtime  insulin lispro (HumaLOG) corrective regimen sliding scale   SubCutaneous three times a day before meals  nebivolol 5 milliGRAM(s) Oral daily  pantoprazole    Tablet 40 milliGRAM(s) Oral before breakfast  sertraline 100 milliGRAM(s) Oral daily  tamoxifen 20 milliGRAM(s) Oral daily  tamsulosin 0.4 milliGRAM(s) Oral at bedtime  valsartan 160 milliGRAM(s) Oral daily    MEDICATIONS  (PRN):  dextrose Gel 1 Dose(s) Oral once PRN Blood Glucose LESS THAN 70 milliGRAM(s)/deciliter  glucagon  Injectable 1 milliGRAM(s) IntraMuscular once PRN Glucose LESS THAN 70 milligrams/deciliter  HYDROmorphone  Injectable 0.5 milliGRAM(s) IV Push every 6 hours PRN Severe Pain (7 - 10)      Allergies: Allergies    codeine (Unknown)          REVIEW OF SYSTEMS:  CONSTITUTIONAL: No fever, weight loss, or fatigue  EYES: No eye pain, visual disturbances, or discharge  ENMT:  No difficulty hearing, tinnitus, vertigo; No sinus or throat pain  NECK: No pain or stiffness  BREASTS: No pain, masses, or nipple discharge  RESPIRATORY: No cough, wheezing, chills or hemoptysis; No shortness of breath  CARDIOVASCULAR: No chest pain, palpitations, dizziness, or leg swelling  GASTROINTESTINAL: No abdominal or epigastric pain. No nausea, vomiting, or hematemesis; No diarrhea or constipation. No melena or hematochezia.  GENITOURINARY: No dysuria, frequency, hematuria, or incontinence  NEUROLOGICAL: No headaches, memory loss, loss of strength, numbness, or tremors  SKIN: No itching, burning, rashes, or lesions   LYMPH NODES: No enlarged glands  ENDOCRINE: No heat or cold intolerance; No hair loss  MUSCULOSKELETAL: No joint pain or swelling; No muscle, back, or extremity pain  PSYCHIATRIC: No depression, anxiety, mood swings, or difficulty sleeping  HEME/LYMPH: No easy bruising, or bleeding gums  ALLERY AND IMMUNOLOGIC: No hives or eczema    T(C): 37.1 (10-30-17 @ 05:17), Max: 37.1 (10-30-17 @ 05:17)  HR: 65 (10-30-17 @ 05:17) (65 - 75)  BP: 151/85 (10-30-17 @ 05:17) (132/88 - 151/85)  RR: 18 (10-30-17 @ 05:17) (18 - 18)  SpO2: 94% (10-30-17 @ 05:17) (92% - 95%)  Wt(kg): --Vital Signs Last 24 Hrs  T(C): 37.1 (30 Oct 2017 05:17), Max: 37.1 (30 Oct 2017 05:17)  T(F): 98.7 (30 Oct 2017 05:17), Max: 98.7 (30 Oct 2017 05:17)  HR: 65 (30 Oct 2017 05:17) (65 - 75)  BP: 151/85 (30 Oct 2017 05:17) (132/88 - 151/85)  BP(mean): --  RR: 18 (30 Oct 2017 05:17) (18 - 18)  SpO2: 94% (30 Oct 2017 05:17) (92% - 95%)  I&O's Summary    29 Oct 2017 07:01  -  30 Oct 2017 07:00  --------------------------------------------------------  IN: 240 mL / OUT: 0 mL / NET: 240 mL    30 Oct 2017 07:01  -  30 Oct 2017 11:57  --------------------------------------------------------  IN: 360 mL / OUT: 0 mL / NET: 360 mL      Last Menstrual Period      PHYSICAL EXAM:  GENERAL: NAD, well-groomed, well-developed  HEAD:  Atraumatic, Normocephalic  EYES: EOMI, PERRLA, conjunctiva and sclera clear  ENMT: No tonsillar erythema, exudates, or enlargement; Moist mucous membranes, Good dentition, No lesions  NECK: Supple, No JVD, Normal thyroid  NERVOUS SYSTEM:  Alert & Oriented X3, Good concentration; Motor Strength 5/5 B/L upper and lower extremities; DTRs 2+ intact and symmetric  CHEST/LUNG: Clear to percussion bilaterally; No rales, rhonchi, wheezing, or rubs  HEART: Regular rate and rhythm; No murmurs, rubs, or gallops  ABDOMEN: Soft, Nontender, Nondistended; Bowel sounds present  EXTREMITIES:  2+ Peripheral Pulses, No clubbing, cyanosis, or edema  LYMPH: No lymphadenopathy noted  SKIN: No rashes or lesions    Consultant(s) Notes Reviewed:  [x ] YES  [ ] NO  Care Discussed with Consultants/Other Providers [ x] YES  [ ] NO  Name of Consultant  LABS:                        10.3   13.5  )-----------( 163      ( 30 Oct 2017 06:59 )             29.3     10-30    138  |  100  |  21  ----------------------------<  143<H>  3.2<L>   |  24  |  0.99    Ca    9.4      30 Oct 2017 07:00  Mg     1.9     10-30    TPro  6.5  /  Alb  3.8  /  TBili  0.4  /  DBili  x   /  AST  13  /  ALT  10  /  AlkPhos  52  10-30    PT/INR - ( 29 Oct 2017 03:09 )   PT: 10.4 sec;   INR: 0.96 ratio         PTT - ( 29 Oct 2017 03:09 )  PTT:32.9 sec    CAPILLARY BLOOD GLUCOSE      POCT Blood Glucose.: 144 mg/dL (30 Oct 2017 08:22)  POCT Blood Glucose.: 145 mg/dL (29 Oct 2017 22:13)  POCT Blood Glucose.: 235 mg/dL (29 Oct 2017 17:42)            RADIOLOGY & ADDITIONAL TESTS:  EKG :     Imaging Personally Reviewed:  [ ] YES  [ ] NO  HEALTH ISSUES - PROBLEM Dx:  BPH (benign prostatic hyperplasia): BPH (benign prostatic hyperplasia)  Depression: Depression  HTN (hypertension): HTN (hypertension)  DM (diabetes mellitus): DM (diabetes mellitus)  Breast cancer in male: Breast cancer in male  Chest pressure: Chest pressure Patient is a 67y old  Male who presents with a chief complaint of chest pain (29 Oct 2017 14:28)      INTERVAL HPI/OVERNIGHT EVENTS:    Patient offers no complaints and wants to be d/c .  Patient states that the chest pain has resolved, no SOb, no cough , no palpitations.  As per Telemetry, patient had PAT of about 3 seconds today.         Medications:MEDICATIONS  (STANDING):  amLODIPine   Tablet 10 milliGRAM(s) Oral daily  aspirin  chewable 81 milliGRAM(s) Oral daily  dextrose 5%. 1000 milliLiter(s) (50 mL/Hr) IV Continuous <Continuous>  dextrose 50% Injectable 12.5 Gram(s) IV Push once  dextrose 50% Injectable 25 Gram(s) IV Push once  dextrose 50% Injectable 25 Gram(s) IV Push once  enoxaparin Injectable 40 milliGRAM(s) SubCutaneous daily  hydrochlorothiazide 50 milliGRAM(s) Oral daily  influenza   Vaccine 0.5 milliLiter(s) IntraMuscular once  insulin lispro (HumaLOG) corrective regimen sliding scale   SubCutaneous at bedtime  insulin lispro (HumaLOG) corrective regimen sliding scale   SubCutaneous three times a day before meals  nebivolol 5 milliGRAM(s) Oral daily  pantoprazole    Tablet 40 milliGRAM(s) Oral before breakfast  sertraline 100 milliGRAM(s) Oral daily  tamoxifen 20 milliGRAM(s) Oral daily  tamsulosin 0.4 milliGRAM(s) Oral at bedtime  valsartan 160 milliGRAM(s) Oral daily    MEDICATIONS  (PRN):  dextrose Gel 1 Dose(s) Oral once PRN Blood Glucose LESS THAN 70 milliGRAM(s)/deciliter  glucagon  Injectable 1 milliGRAM(s) IntraMuscular once PRN Glucose LESS THAN 70 milligrams/deciliter  HYDROmorphone  Injectable 0.5 milliGRAM(s) IV Push every 6 hours PRN Severe Pain (7 - 10)      Allergies: Allergies    codeine (Unknown)          REVIEW OF SYSTEMS:  CONSTITUTIONAL: No fever  NECK: No pain or stiffness  BREASTS: No pain, or nipple discharge or redness.    RESPIRATORY: No cough, wheezing, chills or hemoptysis; No shortness of breath  CARDIOVASCULAR: No current chest pain, palpitations, dizziness, or leg swelling  GASTROINTESTINAL: No abdominal or epigastric pain. No nausea, vomiting, or hematemesis; No diarrhea or constipation.  NEUROLOGICAL: No headaches  MUSCULOSKELETAL: No joint pain or swelling    T(C): 37.1 (10-30-17 @ 05:17), Max: 37.1 (10-30-17 @ 05:17)  HR: 65 (10-30-17 @ 05:17) (65 - 75)  BP: 151/85 (10-30-17 @ 05:17) (132/88 - 151/85)  RR: 18 (10-30-17 @ 05:17) (18 - 18)  SpO2: 94% (10-30-17 @ 05:17) (92% - 95%)  Wt(kg): --Vital Signs Last 24 Hrs  T(C): 37.1 (30 Oct 2017 05:17), Max: 37.1 (30 Oct 2017 05:17)  T(F): 98.7 (30 Oct 2017 05:17), Max: 98.7 (30 Oct 2017 05:17)  HR: 65 (30 Oct 2017 05:17) (65 - 75)  BP: 151/85 (30 Oct 2017 05:17) (132/88 - 151/85)  BP(mean): --  RR: 18 (30 Oct 2017 05:17) (18 - 18)  SpO2: 94% (30 Oct 2017 05:17) (92% - 95%)  I&O's Summary    29 Oct 2017 07:01  -  30 Oct 2017 07:00  --------------------------------------------------------  IN: 240 mL / OUT: 0 mL / NET: 240 mL    30 Oct 2017 07:01  -  30 Oct 2017 11:57  --------------------------------------------------------  IN: 360 mL / OUT: 0 mL / NET: 360 mL      PHYSICAL EXAM:  GENERAL: NAD, well-groomed, well-developed  HEAD:  Atraumatic, Normocephalic  EYES:    conjunctiva and sclera clear  NECK: Supple, No JVD, Normal thyroid  NERVOUS SYSTEM:  Alert & Oriented X3, Good concentration  CHEST/LUNG: Clear to percussion bilaterally; No rales, rhonchi, wheezing, or rubs  HEART: Regular rate and rhythm; No murmurs, rubs, or gallops  ABDOMEN: Soft, Nontender, Nondistended; Bowel sounds present  EXTREMITIES:  2+ Peripheral Pulses, No clubbing, cyanosis, or edema  SKIN            : the Right Expander site is clean with no erythema , no D/C.        Consultant(s) Notes Reviewed:   [ X] NO  Care Discussed with Consultants/Other Providers [ x] YES  outpatient cardiologist / Dr Miner ' s office   Name of Consultant : Dr georges  LABS:                        10.3   13.5  )-----------( 163      ( 30 Oct 2017 06:59 )             29.3     10-30    138  |  100  |  21  ----------------------------<  143<H>  3.2<L>   |  24  |  0.99    Ca    9.4      30 Oct 2017 07:00  Mg     1.9     10-30    TPro  6.5  /  Alb  3.8  /  TBili  0.4  /  DBili  x   /  AST  13  /  ALT  10  /  AlkPhos  52  10-30    PT/INR - ( 29 Oct 2017 03:09 )   PT: 10.4 sec;   INR: 0.96 ratio         PTT - ( 29 Oct 2017 03:09 )  PTT:32.9 sec    CAPILLARY BLOOD GLUCOSE      POCT Blood Glucose.: 144 mg/dL (30 Oct 2017 08:22)  POCT Blood Glucose.: 145 mg/dL (29 Oct 2017 22:13)  POCT Blood Glucose.: 235 mg/dL (29 Oct 2017 17:42)            RADIOLOGY & ADDITIONAL TESTS:  EKG :  reviewed by me with no gross [place] or ST changes noted     Imaging Personally Reviewed:  [ ] YES  [ ] NO  HEALTH ISSUES - PROBLEM Dx:  BPH (benign prostatic hyperplasia): BPH (benign prostatic hyperplasia)  Depression: Depression  HTN (hypertension): HTN (hypertension)  DM (diabetes mellitus): DM (diabetes mellitus)  Breast cancer in male: Breast cancer in male  Chest pressure: Chest pressure

## 2017-10-30 NOTE — DISCHARGE NOTE ADULT - PATIENT PORTAL LINK FT
“You can access the Follow[employer] Patient Portal, offered by [facility], by registering with the following website: [email or web address]”

## 2017-10-30 NOTE — DISCHARGE NOTE ADULT - MEDICATION SUMMARY - MEDICATIONS TO TAKE
I will START or STAY ON the medications listed below when I get home from the hospital:    aspirin 81 mg oral tablet, chewable  -- 1 tab(s) by mouth once a day  -- Indication: For antiplatelet    tamsulosin 0.4 mg oral capsule  -- 1 cap(s) by mouth once a day (at bedtime)  -- Indication: For BPH (benign prostatic hyperplasia)    sertraline 100 mg oral tablet  -- 2 tab(s) by mouth once a day  -- Indication: For Depression    metFORMIN 1000 mg oral tablet, extended release  -- 1 tab(s) by mouth once a day  -- Indication: For Diabetes    gemfibrozil 600 mg oral tablet  -- 1 tab(s) by mouth 2 times a day  -- Indication: For Cholestrol    Exforge 10 mg-160 mg oral tablet  -- 1 tab(s) by mouth once a day  -- Indication: For HTN (hypertension)    tamoxifen 20 mg oral tablet  -- 1 tab(s) by mouth once a day  -- Indication: For Breast cancer in male    Bystolic 5 mg oral tablet  -- 1 tab(s) by mouth once a day  -- Indication: For HTN (hypertension)    hydroCHLOROthiazide 50 mg oral tablet  -- 1 tab(s) by mouth once a day  -- Indication: For HTN (hypertension)    Myrbetriq 25 mg oral tablet, extended release  -- 1 tab(s) by mouth once a day  -- Indication: For HTN (hypertension)

## 2017-10-30 NOTE — DISCHARGE NOTE ADULT - HOSPITAL COURSE
68yo M with  breast cancer s/p chemotherapy and R mastectomy with tissue expanders, p/w chest heaviness x 3 days. Patient received chemotherapy 5 days ago.  Admitted for  c/o pleuritic chest pain. Troponin x 3  negative ,pt remained asymptomatic and HD stable ,  Tele with no notable arrythmias . ECHO -------, will be discharged home with close follow up with PCP DR López 68yo M with  breast cancer s/p chemotherapy and R mastectomy with tissue expanders, p/w chest heaviness x 3 days. Patient received chemotherapy 5 days ago.  Admitted for  c/o pleuritic chest pain. Troponin x 3  negative ,pt remained asymptomatic and hemodynamically stable ,Tele with no notable arrythmia .EKG not showing signs of acute pericarditis. PE ruled out by CTA .ECHO -------, will be discharged home with close follow up with PCP Dr. López monitor esr/crp. 66yo M with  breast cancer s/p chemotherapy and R mastectomy with tissue expanders, p/w chest heaviness x 3 days. Patient received chemotherapy 5 days ago.  Admitted for  c/o pleuritic chest pain. Troponin x 3  negative ,pt remained asymptomatic and hemodynamically stable ,Tele with no notable arrythmia .EKG not showing signs of acute pericarditis. PE ruled out by CTA .ECHO was not done in this hospital since patient had TTE done about one week prior to hospitalization.  Patient had Leucocytosis which has normalized and elevated ESR/ CRP with no clear source of infection.  Blood cultures were done and were negative.  Patient was seen by the Cardiology team and was deemed stable for D/C home and no indication for further work up or intervention.  Pt will be discharged home with close follow up with PCP Dr. López ( Cardiologist) and Dr Gonzalez ( Oncologist) to monitor esr/crp.  More than one hour spent in D/C patient.

## 2017-10-30 NOTE — DISCHARGE NOTE ADULT - PLAN OF CARE
hd stable symptoms resolved , please follow up with PCP DR Bajwa in 1 week Follow up with your medical doctor to establish long term blood pressure treatment goals. HgA1C this admission.  Make sure you get your HgA1c checked every three months.  If you take oral diabetes medications, check your blood glucose two times a day.  If you take insulin, check your blood glucose before meals and at bedtime.  It's important not to skip any meals.  Keep a log of your blood glucose results and always take it with you to your doctor appointments.  Keep a list of your current medications including injectables and over the counter medications and bring this medication list with you to all your doctor appointments.  If you have not seen your opthalmologist this year call for appointment.  Check your feet daily for redness, sores, or openings. Do not self treat. If no improvement in two days call your primary care physician for an appointment.  Low blood sugar (hypoglycemia) is a blood sugar below 70mg/dl. Check your blood sugar if you feel signs/symptoms of hypoglycemia. If your blood sugar is below 70 take 15 grams of carbohydrates (ex 4 oz of apple juice, 3-4 glucosr tablets, or 4-6 oz of regular soda) wait 15 minutes and repeat blood sugar to make sure it comes up above 70.  If your blood sugar is above 70 and you are due for a meal, have a meal.  If you are not due for a meal have a snack.  This snack helps keeps your blood sugar at a safe range. please follow up with your  oncologist at [facility] atypical chest pain   Cardiac enzymesx3 : negative  continue aspirin 81mg daily  symptoms resolved , please follow up with your cardiologist to monitor esr/crp  PE ruled out by CTA  Follow up with PCP DR Bajwa in 1 week HgA1C this admission.5.2  Make sure you get your HgA1c checked every three months.  If you take oral diabetes medications, check your blood glucose two times a day.  If you take insulin, check your blood glucose before meals and at bedtime.  It's important not to skip any meals.  Keep a log of your blood glucose results and always take it with you to your doctor appointments.  Keep a list of your current medications including injectables and over the counter medications and bring this medication list with you to all your doctor appointments.  If you have not seen your opthalmologist this year call for appointment.  Check your feet daily for redness, sores, or openings. Do not self treat. If no improvement in two days call your primary care physician for an appointment.  Low blood sugar (hypoglycemia) is a blood sugar below 70mg/dl. Check your blood sugar if you feel signs/symptoms of hypoglycemia. If your blood sugar is below 70 take 15 grams of carbohydrates (ex 4 oz of apple juice, 3-4 glucosr tablets, or 4-6 oz of regular soda) wait 15 minutes and repeat blood sugar to make sure it comes up above 70.  If your blood sugar is above 70 and you are due for a meal, have a meal.  If you are not due for a meal have a snack.  This snack helps keeps your blood sugar at a safe range. please follow up with your oncologist at [facility]  - c/w tamoxifen 20mg daily c/w sertraline 100mg daily. symptoms resolved

## 2017-10-31 VITALS
SYSTOLIC BLOOD PRESSURE: 131 MMHG | HEART RATE: 65 BPM | TEMPERATURE: 99 F | OXYGEN SATURATION: 95 % | RESPIRATION RATE: 18 BRPM | DIASTOLIC BLOOD PRESSURE: 86 MMHG

## 2017-10-31 LAB
ANION GAP SERPL CALC-SCNC: 15 MMOL/L — SIGNIFICANT CHANGE UP (ref 5–17)
BASOPHILS # BLD AUTO: 0 K/UL — SIGNIFICANT CHANGE UP (ref 0–0.2)
BASOPHILS NFR BLD AUTO: 0 % — SIGNIFICANT CHANGE UP (ref 0–2)
BUN SERPL-MCNC: 22 MG/DL — SIGNIFICANT CHANGE UP (ref 7–23)
CALCIUM SERPL-MCNC: 10 MG/DL — SIGNIFICANT CHANGE UP (ref 8.4–10.5)
CHLORIDE SERPL-SCNC: 96 MMOL/L — SIGNIFICANT CHANGE UP (ref 96–108)
CK MB BLD-MCNC: 1.8 % — SIGNIFICANT CHANGE UP (ref 0–3.5)
CK MB CFR SERPL CALC: 1 NG/ML — SIGNIFICANT CHANGE UP (ref 0–6.7)
CK SERPL-CCNC: 56 U/L — SIGNIFICANT CHANGE UP (ref 30–200)
CO2 SERPL-SCNC: 25 MMOL/L — SIGNIFICANT CHANGE UP (ref 22–31)
CREAT SERPL-MCNC: 0.99 MG/DL — SIGNIFICANT CHANGE UP (ref 0.5–1.3)
EOSINOPHIL # BLD AUTO: 0.1 K/UL — SIGNIFICANT CHANGE UP (ref 0–0.5)
EOSINOPHIL NFR BLD AUTO: 0.6 % — SIGNIFICANT CHANGE UP (ref 0–6)
GLUCOSE BLDC GLUCOMTR-MCNC: 136 MG/DL — HIGH (ref 70–99)
GLUCOSE BLDC GLUCOMTR-MCNC: 169 MG/DL — HIGH (ref 70–99)
GLUCOSE SERPL-MCNC: 163 MG/DL — HIGH (ref 70–99)
HCT VFR BLD CALC: 29.1 % — LOW (ref 39–50)
HGB BLD-MCNC: 10 G/DL — LOW (ref 13–17)
LYMPHOCYTES # BLD AUTO: 0.7 K/UL — LOW (ref 1–3.3)
LYMPHOCYTES # BLD AUTO: 7.7 % — LOW (ref 13–44)
MAGNESIUM SERPL-MCNC: 2 MG/DL — SIGNIFICANT CHANGE UP (ref 1.6–2.6)
MCHC RBC-ENTMCNC: 34.3 GM/DL — SIGNIFICANT CHANGE UP (ref 32–36)
MCHC RBC-ENTMCNC: 34.4 PG — HIGH (ref 27–34)
MCV RBC AUTO: 101 FL — HIGH (ref 80–100)
MONOCYTES # BLD AUTO: 0.6 K/UL — SIGNIFICANT CHANGE UP (ref 0–0.9)
MONOCYTES NFR BLD AUTO: 6.8 % — SIGNIFICANT CHANGE UP (ref 2–14)
NEUTROPHILS # BLD AUTO: 7.8 K/UL — HIGH (ref 1.8–7.4)
NEUTROPHILS NFR BLD AUTO: 84.8 % — HIGH (ref 43–77)
PLATELET # BLD AUTO: 178 K/UL — SIGNIFICANT CHANGE UP (ref 150–400)
POTASSIUM SERPL-MCNC: 3.3 MMOL/L — LOW (ref 3.5–5.3)
POTASSIUM SERPL-SCNC: 3.3 MMOL/L — LOW (ref 3.5–5.3)
RBC # BLD: 2.89 M/UL — LOW (ref 4.2–5.8)
RBC # FLD: 12.9 % — SIGNIFICANT CHANGE UP (ref 10.3–14.5)
SODIUM SERPL-SCNC: 136 MMOL/L — SIGNIFICANT CHANGE UP (ref 135–145)
TROPONIN T SERPL-MCNC: <0.01 NG/ML — SIGNIFICANT CHANGE UP (ref 0–0.06)
WBC # BLD: 9.2 K/UL — SIGNIFICANT CHANGE UP (ref 3.8–10.5)
WBC # FLD AUTO: 9.2 K/UL — SIGNIFICANT CHANGE UP (ref 3.8–10.5)

## 2017-10-31 PROCEDURE — 99223 1ST HOSP IP/OBS HIGH 75: CPT | Mod: GC

## 2017-10-31 PROCEDURE — 99239 HOSP IP/OBS DSCHRG MGMT >30: CPT

## 2017-10-31 PROCEDURE — 93010 ELECTROCARDIOGRAM REPORT: CPT

## 2017-10-31 RX ORDER — ASPIRIN/CALCIUM CARB/MAGNESIUM 324 MG
1 TABLET ORAL
Qty: 0 | Refills: 0 | COMMUNITY
Start: 2017-10-31

## 2017-10-31 RX ORDER — POTASSIUM CHLORIDE 20 MEQ
40 PACKET (EA) ORAL ONCE
Qty: 0 | Refills: 0 | Status: COMPLETED | OUTPATIENT
Start: 2017-10-31 | End: 2017-10-31

## 2017-10-31 RX ORDER — SERTRALINE 25 MG/1
1 TABLET, FILM COATED ORAL
Qty: 0 | Refills: 0 | COMMUNITY

## 2017-10-31 RX ORDER — SERTRALINE 25 MG/1
2 TABLET, FILM COATED ORAL
Qty: 0 | Refills: 0 | COMMUNITY

## 2017-10-31 RX ORDER — TAMSULOSIN HYDROCHLORIDE 0.4 MG/1
1 CAPSULE ORAL
Qty: 0 | Refills: 0 | COMMUNITY

## 2017-10-31 RX ORDER — SERTRALINE 25 MG/1
200 TABLET, FILM COATED ORAL DAILY
Qty: 0 | Refills: 0 | Status: DISCONTINUED | OUTPATIENT
Start: 2017-10-31 | End: 2017-10-31

## 2017-10-31 RX ORDER — TAMOXIFEN CITRATE 20 MG/1
1 TABLET, FILM COATED ORAL
Qty: 0 | Refills: 0 | COMMUNITY
Start: 2017-10-31

## 2017-10-31 RX ADMIN — VALSARTAN 160 MILLIGRAM(S): 80 TABLET ORAL at 08:31

## 2017-10-31 RX ADMIN — Medication 40 MILLIEQUIVALENT(S): at 11:30

## 2017-10-31 RX ADMIN — AMLODIPINE BESYLATE 10 MILLIGRAM(S): 2.5 TABLET ORAL at 08:30

## 2017-10-31 RX ADMIN — Medication 2: at 08:26

## 2017-10-31 RX ADMIN — SERTRALINE 100 MILLIGRAM(S): 25 TABLET, FILM COATED ORAL at 11:30

## 2017-10-31 RX ADMIN — PANTOPRAZOLE SODIUM 40 MILLIGRAM(S): 20 TABLET, DELAYED RELEASE ORAL at 08:32

## 2017-10-31 NOTE — PROGRESS NOTE ADULT - PROBLEM SELECTOR PLAN 2
Last WBC done at the Oncologist in [facility] on 10/24/17=  6.2  ( I do not have a previous ESR/ CRP from Oncologist)   Admission WBC= 16 today WBC = 9.2 / COuld be Acute pericarditis evolving post visit to the oncologist.    Cardio consult is generated
ER+ PA-   Patient has completed Chemotherapy on Tamoxifen

## 2017-10-31 NOTE — PROVIDER CONTACT NOTE (OTHER) - SITUATION
Refusing bedtime blood glucose check. Stating " they checked my sugar earlier, tomorrow morning can check again"

## 2017-10-31 NOTE — PROGRESS NOTE ADULT - ASSESSMENT
68yo M with  ER+ [place]-  breast cancer s/p chemotherapy and R mastectomy with tissue expanders, p/w chest heaviness x 3 days. Patient received chemotherapy 5 days ago and is on Tamoxifen.  Patient had no fever or chills or known sick contact .  Patient has been hemodynamically stable and has received one dose of  mg, 2 Liters of NS boluses, IV tylenol and IV dilaudid with no current chest pain.  Troponin Negative X 3 with elevated CRP and ESR as well as WBC which is now resolved.  Repeat ECG done today with noted ST elevation in most of the leads with negative cardiac enzymes done today.

## 2017-10-31 NOTE — CONSULT NOTE ADULT - SUBJECTIVE AND OBJECTIVE BOX
Date of Admission: 10/31/17     OP Cardiologist: Dr. Bajwa (unavailable this week per primary team)    Patient is a 67y old  Male who presents with a chief complaint of chest pain (30 Oct 2017 13:18)    HISTORY OF PRESENT ILLNESS:     66 yo gentleman w/PMHx HTN, HLD, DM,  ER+ NH-  breast cancer s/p chemotherapy and R mastectomy with tissue expanders presented on 10/29 with CP    Allergies  codeine (Unknown)    MEDICATIONS:  amLODIPine   Tablet 10 milliGRAM(s) Oral daily  aspirin  chewable 81 milliGRAM(s) Oral daily  enoxaparin Injectable 40 milliGRAM(s) SubCutaneous daily  hydrochlorothiazide 50 milliGRAM(s) Oral daily  nebivolol 5 milliGRAM(s) Oral daily  tamsulosin 0.4 milliGRAM(s) Oral at bedtime  valsartan 160 milliGRAM(s) Oral daily    HYDROmorphone  Injectable 0.5 milliGRAM(s) IV Push every 6 hours PRN  sertraline 100 milliGRAM(s) Oral daily    pantoprazole    Tablet 40 milliGRAM(s) Oral before breakfast    dextrose 50% Injectable 12.5 Gram(s) IV Push once  dextrose 50% Injectable 25 Gram(s) IV Push once  dextrose 50% Injectable 25 Gram(s) IV Push once  dextrose Gel 1 Dose(s) Oral once PRN  glucagon  Injectable 1 milliGRAM(s) IntraMuscular once PRN  insulin lispro (HumaLOG) corrective regimen sliding scale   SubCutaneous at bedtime  insulin lispro (HumaLOG) corrective regimen sliding scale   SubCutaneous three times a day before meals    dextrose 5%. 1000 milliLiter(s) IV Continuous <Continuous>  influenza   Vaccine 0.5 milliLiter(s) IntraMuscular once  tamoxifen 20 milliGRAM(s) Oral daily    PAST MEDICAL & SURGICAL HISTORY:  Breast cancer in male  Cholecystitis  HTN (hypertension)  High cholesterol  DM (diabetes mellitus)  H/O mastectomy, right  History of cholecystectomy    FAMILY HISTORY:  No pertinent family history in first degree relatives    SOCIAL HISTORY:    Non smoker.     REVIEW OF SYSTEMS:    CONSTITUTIONAL: No weakness, fevers or chills  EYES/ENT: No visual changes;  No dysphagia  NECK: No pain or stiffness  RESPIRATORY: No cough, wheezing, hemoptysis; No shortness of breath  CARDIOVASCULAR: No chest pain or palpitations; No lower extremity edema  GASTROINTESTINAL: No abdominal or epigastric pain. No nausea, vomiting, or hematemesis; No diarrhea or constipation. No melena or hematochezia.  BACK: No back pain  GENITOURINARY: No dysuria, frequency or hematuria  NEUROLOGICAL: No numbness or weakness  SKIN: No itching, burning, rashes, or lesions   All other review of systems is negative unless indicated above.  PHYSICAL EXAM:  T(C): 36.8 (10-31-17 @ 05:42), Max: 37.5 (10-30-17 @ 21:13)  HR: 60 (10-31-17 @ 05:42) (60 - 79)  BP: 125/84 (10-31-17 @ 05:42) (125/84 - 155/85)  RR: 18 (10-31-17 @ 05:42) (18 - 18)  SpO2: 93% (10-31-17 @ 05:42) (92% - 100%)  Wt(kg): --  I&O's Summary    30 Oct 2017 07:01  -  31 Oct 2017 07:00  --------------------------------------------------------  IN: 610 mL / OUT: 0 mL / NET: 610 mL      Appearance:   HEENT:   Normal oral mucosa, PERRL, EOMI	  Lymphatic: No lymphadenopathy  Cardiovascular: Normal S1 S2, No JVD, No murmurs, No edema  Respiratory: Lungs clear to auscultation	  Psychiatry: A & O x 3, Mood & affect appropriate  Gastrointestinal:  Soft, Non-tender, + BS	  Skin: No rashes, No ecchymoses, No cyanosis	  Neurologic: Non-focal  Extremities: Normal range of motion, No clubbing, cyanosis or edema  Vascular: Peripheral pulses palpable 2+ bilaterally    LABS:	 	    CBC Full  -  ( 31 Oct 2017 10:55 )  WBC Count : 9.2 K/uL  Hemoglobin : 10.0 g/dL  Hematocrit : 29.1 %  Platelet Count - Automated : 178 K/uL  Mean Cell Volume : 101.0 fl  Mean Cell Hemoglobin : 34.4 pg  Mean Cell Hemoglobin Concentration : 34.3 gm/dL  Auto Neutrophil # : 7.8 K/uL  Auto Lymphocyte # : 0.7 K/uL  Auto Monocyte # : 0.6 K/uL  Auto Eosinophil # : 0.1 K/uL  Auto Basophil # : 0.0 K/uL  Auto Neutrophil % : 84.8 %  Auto Lymphocyte % : 7.7 %  Auto Monocyte % : 6.8 %  Auto Eosinophil % : 0.6 %  Auto Basophil % : 0.0 %    10-31    136  |  96  |  22  ----------------------------<  163<H>  3.3<L>   |  25  |  0.99  10-30    138  |  100  |  21  ----------------------------<  143<H>  3.2<L>   |  24  |  0.99    Ca    10.0      31 Oct 2017 05:44  Ca    9.4      30 Oct 2017 07:00  Mg     2.0     10-31  Mg     1.9     10-30    TPro  6.5  /  Alb  3.8  /  TBili  0.4  /  DBili  x   /  AST  13  /  ALT  10  /  AlkPhos  52  10-30    Bcx NGTD x2     RVP negative.     TELEMETRY: 	      ECG:  	    RADIOLOGY:    CTA Chest: No pericardial effusion. No main, lobar, or segmental pulmonary embolism. Limited evaluation for   subsegmental pulmonary embolism secondary to respiratory motion.      PREVIOUS DIAGNOSTIC TESTING:    [ ] Echocardiogram:   [ ]  Catheterization:  [ ] Stress Test:  	  	  ASSESSMENT/PLAN: 	    67M w/HTN, HLD, DM,  ER+ NH-  breast cancer s/p chemotherapy and R mastectomy cardiology consult for concern for pericarditis. Date of Admission: 10/31/17     OP Cardiologist: Dr. Bajwa (unavailable this week per primary team)    Patient is a 67y old  Male who presents with a chief complaint of chest pain (30 Oct 2017 13:18)    HISTORY OF PRESENT ILLNESS:     68 yo gentleman w/PMHx HTN, HLD, DM,  ER+ NM-  breast cancer s/p chemotherapy and R mastectomy with tissue expanders presented on 10/29 with substernal chest pain for 2 days. He reports that on Friday the day before the CP started, he had exercised strenuously with his  at the gym. He said he was lifting a significant amount of weights for approx 60 reps with each activity. He noted significant muscle soreness the following day. On Sunday he reported significant "muscular pain" and substernal non-radiating chest pressure without any associated symptoms. He reports that he presented to the ED because he was concerned about the pain. Denied any recent fevers/chills, malaise/sick contacts, or URI symptoms.     Allergies  Codeine    MEDICATIONS:  amLODIPine   Tablet 10 milliGRAM(s) Oral daily  aspirin  chewable 81 milliGRAM(s) Oral daily  enoxaparin Injectable 40 milliGRAM(s) SubCutaneous daily  hydrochlorothiazide 50 milliGRAM(s) Oral daily  nebivolol 5 milliGRAM(s) Oral daily  tamsulosin 0.4 milliGRAM(s) Oral at bedtime  valsartan 160 milliGRAM(s) Oral daily    HYDROmorphone  Injectable 0.5 milliGRAM(s) IV Push every 6 hours PRN  sertraline 100 milliGRAM(s) Oral daily    pantoprazole    Tablet 40 milliGRAM(s) Oral before breakfast    dextrose 50% Injectable 12.5 Gram(s) IV Push once  dextrose 50% Injectable 25 Gram(s) IV Push once  dextrose 50% Injectable 25 Gram(s) IV Push once  dextrose Gel 1 Dose(s) Oral once PRN  glucagon  Injectable 1 milliGRAM(s) IntraMuscular once PRN  insulin lispro (HumaLOG) corrective regimen sliding scale   SubCutaneous at bedtime  insulin lispro (HumaLOG) corrective regimen sliding scale   SubCutaneous three times a day before meals    dextrose 5%. 1000 milliLiter(s) IV Continuous <Continuous>  influenza   Vaccine 0.5 milliLiter(s) IntraMuscular once  tamoxifen 20 milliGRAM(s) Oral daily    PAST MEDICAL & SURGICAL HISTORY:  Breast cancer in male  Cholecystitis  HTN (hypertension)  High cholesterol  DM (diabetes mellitus)  H/O mastectomy, right  History of cholecystectomy    FAMILY HISTORY:  No pertinent family history in first degree relatives    SOCIAL HISTORY:    Non smoker.     REVIEW OF SYSTEMS:    CONSTITUTIONAL: No weakness, fevers or chills  EYES/ENT: No visual changes;  No dysphagia  NECK: No pain or stiffness  RESPIRATORY: No cough, wheezing, hemoptysis; No shortness of breath  CARDIOVASCULAR: No  palpitations; No lower extremity edema  GASTROINTESTINAL: No abdominal or epigastric pain. No nausea, vomiting, or hematemesis; No diarrhea or constipation. No melena or hematochezia.  BACK: No back pain  GENITOURINARY: No dysuria, frequency or hematuria  NEUROLOGICAL: No numbness or weakness  SKIN: No itching, burning, rashes, or lesions   All other review of systems is negative unless indicated above.  PHYSICAL EXAM:  T(C): 36.8 (10-31-17 @ 05:42), Max: 37.5 (10-30-17 @ 21:13)  HR: 60 (10-31-17 @ 05:42) (60 - 79)  BP: 125/84 (10-31-17 @ 05:42) (125/84 - 155/85)  RR: 18 (10-31-17 @ 05:42) (18 - 18)  SpO2: 93% (10-31-17 @ 05:42) (92% - 100%)  Wt(kg): --  I&O's Summary    30 Oct 2017 07:01  -  31 Oct 2017 07:00  --------------------------------------------------------  IN: 610 mL / OUT: 0 mL / NET: 610 mL      Appearance: Well appearing, no acute distress, seen ambulating without difficulty   HEENT:   Normal oral mucosa, PERRL, EOMI	  Lymphatic: No lymphadenopathy  Cardiovascular: Normal S1 S2, No JVD, No murmurs, No edema  Respiratory: Lungs clear to auscultation	  Psychiatry: A & O x 3, Mood & affect appropriate  Gastrointestinal:  Soft, Non-tender, + BS	  Skin: No rashes, No ecchymoses, No cyanosis	  Neurologic: Non-focal  Extremities: Normal range of motion, No clubbing, cyanosis or edema  Vascular: Peripheral pulses palpable 2+ bilaterally    LABS:	 	    CBC Full  -  ( 31 Oct 2017 10:55 )  WBC Count : 9.2 K/uL  Hemoglobin : 10.0 g/dL  Hematocrit : 29.1 %  Platelet Count - Automated : 178 K/uL  Mean Cell Volume : 101.0 fl  Mean Cell Hemoglobin : 34.4 pg  Mean Cell Hemoglobin Concentration : 34.3 gm/dL  Auto Neutrophil # : 7.8 K/uL  Auto Lymphocyte # : 0.7 K/uL  Auto Monocyte # : 0.6 K/uL  Auto Eosinophil # : 0.1 K/uL  Auto Basophil # : 0.0 K/uL  Auto Neutrophil % : 84.8 %  Auto Lymphocyte % : 7.7 %  Auto Monocyte % : 6.8 %  Auto Eosinophil % : 0.6 %  Auto Basophil % : 0.0 %    10-31    136  |  96  |  22  ----------------------------<  163<H>  3.3<L>   |  25  |  0.99  10-30    138  |  100  |  21  ----------------------------<  143<H>  3.2<L>   |  24  |  0.99    Ca    10.0      31 Oct 2017 05:44  Ca    9.4      30 Oct 2017 07:00  Mg     2.0     10-31  Mg     1.9     10-30    TPro  6.5  /  Alb  3.8  /  TBili  0.4  /  DBili  x   /  AST  13  /  ALT  10  /  AlkPhos  52  10-30    Bcx NGTD x2     RVP negative.     ECG:  	NSR HR 65     RADIOLOGY:    CTA Chest: No pericardial effusion. No main, lobar, or segmental pulmonary embolism. Limited evaluation for   subsegmental pulmonary embolism secondary to respiratory motion.    PREVIOUS DIAGNOSTIC TESTING:      [ ] Echocardiogram 10/24/17: Normal LV wall thickness, chamber size, and systolic function. EF 73% Normal diastolic function. Normal RV and function. Borderline pulm HTN PASP 36-40mmHg. Estimated RA pressure 0-5mmHg. Mild to moderate MR. No significant pericardial disease seen. No change significant from prior echo.   	  ASSESSMENT/PLAN: 	    67M w/HTN, HLD, DM,  ER+ NM-  breast cancer s/p chemotherapy and R mastectomy cardiology consult for concern for pericarditis now asymptomatic chest pain free.     #Atypical Chest Pain--chest pain now resolved; Clinical story consistent with MSK pain as exacerbated by movement. Non pleuritic. Serial trops negative. Recent TTE 10/24 without any significant change from prior. Despite elevated ESR and leukocytosis, the pain has resolved therefore would not treat for pericarditis.   - c/w antihypertensive regimen   - Follow up with OP cardiologist Garett on discharge.

## 2017-10-31 NOTE — PROGRESS NOTE ADULT - PROBLEM SELECTOR PLAN 1
NO resolved / Pt had a full dose of ASA from ED   Troponin Negative X 4   Pt had TTE done as outpatient on 10/24/17 before the chest pain event and was non revealing   RVP is negative   Repeat ECG done today with noted <1mm St elevation in most of the leads / concern for acute pericarditis.    Cardio evaluation is called / pt with resolved CP, no SOB, no fever.

## 2017-10-31 NOTE — CONSULT NOTE ADULT - ATTENDING COMMENTS
67 year old man with chest pain not characteristic of angina or pericarditis, unremarkable exam, EKG and negative enzymes. No role for cardiac testing and no cardiovascular reason to prolong acute hospital stay.

## 2017-10-31 NOTE — PROGRESS NOTE ADULT - PROBLEM SELECTOR PLAN 3
Pt is s/p Chemotherapy / currently on Tamoxifen. Patient is S/P Rt Mastectomy and Rt Breast expander with no erythema or d/c noted

## 2017-10-31 NOTE — PROGRESS NOTE ADULT - PROBLEM SELECTOR PLAN 4
Glucose is currently stable Glucose is currently stable    Patient was seen by cardiologist and was cleared for home d/c and f/up with outpatient cardiologist and oncologist as well as PCP.

## 2017-11-04 LAB
CULTURE RESULTS: SIGNIFICANT CHANGE UP
CULTURE RESULTS: SIGNIFICANT CHANGE UP
SPECIMEN SOURCE: SIGNIFICANT CHANGE UP
SPECIMEN SOURCE: SIGNIFICANT CHANGE UP

## 2017-12-19 PROCEDURE — 80061 LIPID PANEL: CPT

## 2017-12-19 PROCEDURE — 80053 COMPREHEN METABOLIC PANEL: CPT

## 2017-12-19 PROCEDURE — 87581 M.PNEUMON DNA AMP PROBE: CPT

## 2017-12-19 PROCEDURE — 87040 BLOOD CULTURE FOR BACTERIA: CPT

## 2017-12-19 PROCEDURE — 96375 TX/PRO/DX INJ NEW DRUG ADDON: CPT | Mod: XU

## 2017-12-19 PROCEDURE — 85652 RBC SED RATE AUTOMATED: CPT

## 2017-12-19 PROCEDURE — 99285 EMERGENCY DEPT VISIT HI MDM: CPT | Mod: 25

## 2017-12-19 PROCEDURE — 86140 C-REACTIVE PROTEIN: CPT

## 2017-12-19 PROCEDURE — 87486 CHLMYD PNEUM DNA AMP PROBE: CPT

## 2017-12-19 PROCEDURE — 85730 THROMBOPLASTIN TIME PARTIAL: CPT

## 2017-12-19 PROCEDURE — 87633 RESP VIRUS 12-25 TARGETS: CPT

## 2017-12-19 PROCEDURE — 83036 HEMOGLOBIN GLYCOSYLATED A1C: CPT

## 2017-12-19 PROCEDURE — 80048 BASIC METABOLIC PNL TOTAL CA: CPT

## 2017-12-19 PROCEDURE — 71275 CT ANGIOGRAPHY CHEST: CPT

## 2017-12-19 PROCEDURE — 85027 COMPLETE CBC AUTOMATED: CPT

## 2017-12-19 PROCEDURE — 96374 THER/PROPH/DIAG INJ IV PUSH: CPT | Mod: XU

## 2017-12-19 PROCEDURE — 82550 ASSAY OF CK (CPK): CPT

## 2017-12-19 PROCEDURE — 82962 GLUCOSE BLOOD TEST: CPT

## 2017-12-19 PROCEDURE — 87798 DETECT AGENT NOS DNA AMP: CPT

## 2017-12-19 PROCEDURE — 71046 X-RAY EXAM CHEST 2 VIEWS: CPT

## 2017-12-19 PROCEDURE — 84484 ASSAY OF TROPONIN QUANT: CPT

## 2017-12-19 PROCEDURE — 82553 CREATINE MB FRACTION: CPT

## 2017-12-19 PROCEDURE — 85610 PROTHROMBIN TIME: CPT

## 2017-12-19 PROCEDURE — 93005 ELECTROCARDIOGRAM TRACING: CPT

## 2017-12-19 PROCEDURE — 83880 ASSAY OF NATRIURETIC PEPTIDE: CPT

## 2017-12-19 PROCEDURE — 83735 ASSAY OF MAGNESIUM: CPT

## 2019-02-28 NOTE — PROGRESS NOTE ADULT - SUBJECTIVE AND OBJECTIVE BOX
Requested Prescriptions   Pending Prescriptions Disp Refills     testosterone cypionate (DEPO-TESTOSTERONE) 200 MG/ML injection 10 mL 1    Last Written Prescription Date:  06/28/2018  Last Fill Quantity: 10ml,  # refills: 1   Last office visit: 9/28/2018 with prescribing provider:  09/28/2018   Future Office Visit:     Sig: Inject 1 mL (200 mg) into the muscle every 14 days    Androgen Agents Failed - 2/28/2019  7:24 AM       Failed - Lipid panel on file in past 12 mos    Recent Labs   Lab Test 11/18/16  0720 09/14/15  1045   CHOL 197 217*   TRIG 184* 181*   HDL 39* 44   * 137*   NHDL 158*  --    VLDL  --  36*   CHOLHDLRATIO  --  4.9              Failed - ALT on file within past 12 mos    Recent Labs   Lab Test 11/18/16  0720   ALT 99*            Failed - HCT less than 54% on file within past 12 mos    Recent Labs   Lab Test 11/18/16  0720   HCT 41.9            Failed - Serum Testosterone on file within past 12 mos    Recent Labs   Lab Test 11/02/17  0909   TESTOSTTOTAL 558            Failed - Serum PSA on file within past 12 mos    Lab Results   Component Value Date    PSA 0.81 11/18/2016            Failed - Refills for this classification require provider review       Failed - AST on file within past 12 mos    Recent Labs   Lab Test 11/18/16  0720   AST 34            Passed - Patient is of age 12 and older       Passed - Medication is active on med list       Passed - Blood pressure under 140/90 in past 6 months    BP Readings from Last 3 Encounters:   09/28/18 138/80   08/20/18 134/86   08/08/18 (!) 140/95                Passed - Patient is not pregnant       Passed - No positive pregnancy test on file within past 12 mos       Passed - Recent (6 mo) or future (30 days) visit within the authorizing provider's specialty        Boaz RAMSEY   Patient is a 67y old  Male who presents with a chief complaint of chest pain (30 Oct 2017 13:18)      INTERVAL HPI/OVERNIGHT EVENTS:    Patient is seen this AM and states to be doing well with no chest pain or SOB or fever.        Medications:MEDICATIONS  (STANDING):  amLODIPine   Tablet 10 milliGRAM(s) Oral daily  aspirin  chewable 81 milliGRAM(s) Oral daily  dextrose 5%. 1000 milliLiter(s) (50 mL/Hr) IV Continuous <Continuous>  dextrose 50% Injectable 12.5 Gram(s) IV Push once  dextrose 50% Injectable 25 Gram(s) IV Push once  dextrose 50% Injectable 25 Gram(s) IV Push once  enoxaparin Injectable 40 milliGRAM(s) SubCutaneous daily  hydrochlorothiazide 50 milliGRAM(s) Oral daily  influenza   Vaccine 0.5 milliLiter(s) IntraMuscular once  insulin lispro (HumaLOG) corrective regimen sliding scale   SubCutaneous at bedtime  insulin lispro (HumaLOG) corrective regimen sliding scale   SubCutaneous three times a day before meals  nebivolol 5 milliGRAM(s) Oral daily  pantoprazole    Tablet 40 milliGRAM(s) Oral before breakfast  sertraline 100 milliGRAM(s) Oral daily  tamoxifen 20 milliGRAM(s) Oral daily  tamsulosin 0.4 milliGRAM(s) Oral at bedtime  valsartan 160 milliGRAM(s) Oral daily    MEDICATIONS  (PRN):  dextrose Gel 1 Dose(s) Oral once PRN Blood Glucose LESS THAN 70 milliGRAM(s)/deciliter  glucagon  Injectable 1 milliGRAM(s) IntraMuscular once PRN Glucose LESS THAN 70 milligrams/deciliter  HYDROmorphone  Injectable 0.5 milliGRAM(s) IV Push every 6 hours PRN Severe Pain (7 - 10)      Allergies: Allergies    codeine (Unknown)      REVIEW OF SYSTEMS:  CONSTITUTIONAL: No fever, or fatigue  RESPIRATORY: No cough, wheezing, chills or hemoptysis; No shortness of breath  CARDIOVASCULAR: No chest pain, palpitations, dizziness, or leg swelling  GASTROINTESTINAL: No abdominal or epigastric pain. No nausea, vomiting, or hematemesis; No diarrhea or constipation. No melena or hematochezia.  NEUROLOGICAL: No headaches      T(C): 36.8 (10-31-17 @ 05:42), Max: 37.5 (10-30-17 @ 21:13)  HR: 60 (10-31-17 @ 05:42) (60 - 79)  BP: 125/84 (10-31-17 @ 05:42) (125/84 - 155/85)  RR: 18 (10-31-17 @ 05:42) (18 - 18)  SpO2: 93% (10-31-17 @ 05:42) (92% - 100%)  Wt(kg): --Vital Signs Last 24 Hrs  T(C): 36.8 (31 Oct 2017 05:42), Max: 37.5 (30 Oct 2017 21:13)  T(F): 98.2 (31 Oct 2017 05:42), Max: 99.5 (30 Oct 2017 21:13)  HR: 60 (31 Oct 2017 05:42) (60 - 79)  BP: 125/84 (31 Oct 2017 05:42) (125/84 - 155/85)  BP(mean): --  RR: 18 (31 Oct 2017 05:42) (18 - 18)  SpO2: 93% (31 Oct 2017 05:42) (92% - 100%)  I&O's Summary    30 Oct 2017 07:01  -  31 Oct 2017 07:00  --------------------------------------------------------      PHYSICAL EXAM:  GENERAL: NAD, well-groomed, well-developed  HEAD:  Atraumatic, Normocephalic  EYES:  conjunctiva and sclera clear  NECK: Supple, No JVD, Normal thyroid  NERVOUS SYSTEM:  Alert & Oriented X3, Good concentration  CHEST/LUNG: Clear to percussion bilaterally; No rales, rhonchi, wheezing, or rubs  HEART: Regular rate and rhythm; No murmurs, rubs, or gallops  ABDOMEN: Soft, Nontender, Nondistended; Bowel sounds present  EXTREMITIES:  2+ Peripheral Pulses, No clubbing, cyanosis, or edema      Consultant(s) Notes Reviewed:   [ X] NO  Care Discussed with Consultants/Other Providers [ x] YES  Cardiology fellow and Dr Bajwa's office   Name of Consultant  LABS:                        10.0   9.2   )-----------( 178      ( 31 Oct 2017 10:55 )             29.1     10-31    136  |  96  |  22  ----------------------------<  163<H>  3.3<L>   |  25  |  0.99    Ca    10.0      31 Oct 2017 05:44  Mg     2.0     10-31    TPro  6.5  /  Alb  3.8  /  TBili  0.4  /  DBili  x   /  AST  13  /  ALT  10  /  AlkPhos  52  10-30        CAPILLARY BLOOD GLUCOSE      POCT Blood Glucose.: 169 mg/dL (31 Oct 2017 08:17)  POCT Blood Glucose.: 138 mg/dL (30 Oct 2017 17:35)  POCT Blood Glucose.: 115 mg/dL (30 Oct 2017 12:26)            RADIOLOGY & ADDITIONAL TESTS:  EKG : reviewed by me     Imaging Personally Reviewed:  [ ] YES  [ ] NO  HEALTH ISSUES - PROBLEM Dx:  Leukocytosis, unspecified type: Leukocytosis, unspecified type  Type 2 diabetes mellitus without complication, without long-term current use of insulin: Type 2 diabetes mellitus without complication, without long-term current use of insulin  Essential hypertension: Essential hypertension  Pleuritic chest pain: Pleuritic chest pain  BPH (benign prostatic hyperplasia): BPH (benign prostatic hyperplasia)  Depression: Depression  HTN (hypertension): HTN (hypertension)  DM (diabetes mellitus): DM (diabetes mellitus)  Breast cancer in male: Breast cancer in male  Chest pressure: Chest pressure

## 2020-12-29 NOTE — ED ADULT TRIAGE NOTE - MEANS OF ARRIVAL
Patient called office to request a refill on the following medication: amLODIPine (NORVASC) 10 MG tablet  Pharmacy Verified: [id number]   ambulatory

## 2022-11-09 NOTE — PATIENT PROFILE ADULT. - NS PRO ABUSE SCREEN SUSPICION NEGLECT YN
Quality 226: Preventive Care And Screening: Tobacco Use: Screening And Cessation Intervention: Patient screened for tobacco use and is an ex/non-smoker Detail Level: Detailed Quality 111:Pneumonia Vaccination Status For Older Adults: Pneumococcal Vaccination Previously Received no